# Patient Record
Sex: MALE | Race: WHITE | NOT HISPANIC OR LATINO | Employment: FULL TIME | URBAN - METROPOLITAN AREA
[De-identification: names, ages, dates, MRNs, and addresses within clinical notes are randomized per-mention and may not be internally consistent; named-entity substitution may affect disease eponyms.]

---

## 2023-11-22 ENCOUNTER — HOSPITAL ENCOUNTER (EMERGENCY)
Facility: HOSPITAL | Age: 28
Discharge: HOME | End: 2023-11-22
Attending: EMERGENCY MEDICINE
Payer: COMMERCIAL

## 2023-11-22 VITALS
HEART RATE: 81 BPM | WEIGHT: 215 LBS | SYSTOLIC BLOOD PRESSURE: 133 MMHG | HEIGHT: 69 IN | TEMPERATURE: 97 F | OXYGEN SATURATION: 97 % | RESPIRATION RATE: 16 BRPM | BODY MASS INDEX: 31.84 KG/M2 | DIASTOLIC BLOOD PRESSURE: 100 MMHG

## 2023-11-22 DIAGNOSIS — L03.116 CELLULITIS OF LEFT LOWER EXTREMITY: Primary | ICD-10-CM

## 2023-11-22 PROCEDURE — 2500000004 HC RX 250 GENERAL PHARMACY W/ HCPCS (ALT 636 FOR OP/ED): Performed by: EMERGENCY MEDICINE

## 2023-11-22 PROCEDURE — 99283 EMERGENCY DEPT VISIT LOW MDM: CPT

## 2023-11-22 PROCEDURE — 99285 EMERGENCY DEPT VISIT HI MDM: CPT | Performed by: EMERGENCY MEDICINE

## 2023-11-22 RX ORDER — SULFAMETHOXAZOLE AND TRIMETHOPRIM 800; 160 MG/1; MG/1
1 TABLET ORAL EVERY 12 HOURS
Qty: 20 TABLET | Refills: 0 | Status: SHIPPED | OUTPATIENT
Start: 2023-11-22 | End: 2023-12-02

## 2023-11-22 RX ORDER — SULFAMETHOXAZOLE AND TRIMETHOPRIM 800; 160 MG/1; MG/1
1 TABLET ORAL ONCE
Status: COMPLETED | OUTPATIENT
Start: 2023-11-22 | End: 2023-11-22

## 2023-11-22 RX ADMIN — SULFAMETHOXAZOLE AND TRIMETHOPRIM 1 TABLET: 800; 160 TABLET ORAL at 09:26

## 2023-11-22 ASSESSMENT — COLUMBIA-SUICIDE SEVERITY RATING SCALE - C-SSRS
1. IN THE PAST MONTH, HAVE YOU WISHED YOU WERE DEAD OR WISHED YOU COULD GO TO SLEEP AND NOT WAKE UP?: NO
2. HAVE YOU ACTUALLY HAD ANY THOUGHTS OF KILLING YOURSELF?: NO
6. HAVE YOU EVER DONE ANYTHING, STARTED TO DO ANYTHING, OR PREPARED TO DO ANYTHING TO END YOUR LIFE?: NO

## 2023-11-22 ASSESSMENT — LIFESTYLE VARIABLES
EVER HAD A DRINK FIRST THING IN THE MORNING TO STEADY YOUR NERVES TO GET RID OF A HANGOVER: NO
HAVE YOU EVER FELT YOU SHOULD CUT DOWN ON YOUR DRINKING: NO
HAVE PEOPLE ANNOYED YOU BY CRITICIZING YOUR DRINKING: NO
EVER FELT BAD OR GUILTY ABOUT YOUR DRINKING: NO
REASON UNABLE TO ASSESS: NO

## 2023-11-22 ASSESSMENT — PAIN SCALES - GENERAL: PAINLEVEL_OUTOF10: 8

## 2023-11-22 ASSESSMENT — PAIN - FUNCTIONAL ASSESSMENT: PAIN_FUNCTIONAL_ASSESSMENT: 0-10

## 2023-11-22 NOTE — ED PROVIDER NOTES
HPI   Chief Complaint   Patient presents with    Wound Check     Had multiple scabbed wounds on his LLE. States they have been there aprox 1.5 weeks and now they are becoming painful. Denies any injury to the area.        28 year old male chief complaint of wound on his left leg, no fevers or chills. It started off as a scab and progressed to some redness around it. No swelling or drainage. He has a history of MRSA wounds, and had to have cysts excised. No other symptoms or complaints.                               No data recorded                Patient History   History reviewed. No pertinent past medical history.  Past Surgical History:   Procedure Laterality Date    OTHER SURGICAL HISTORY  02/16/2021    Knee surgery     No family history on file.  Social History     Tobacco Use    Smoking status: Never    Smokeless tobacco: Never   Vaping Use    Vaping Use: Every day   Substance Use Topics    Alcohol use: Not on file    Drug use: Not on file       Physical Exam   ED Triage Vitals [11/22/23 0826]   Temp Heart Rate Resp BP   36.1 °C (97 °F) 81 16 (!) 133/100      SpO2 Temp src Heart Rate Source Patient Position   97 % -- -- --      BP Location FiO2 (%)     -- --       Physical Exam  Vitals and nursing note reviewed.   Constitutional:       Appearance: Normal appearance.   HENT:      Head: Normocephalic and atraumatic.      Nose: Nose normal.      Mouth/Throat:      Mouth: Mucous membranes are moist.   Eyes:      Extraocular Movements: Extraocular movements intact.      Pupils: Pupils are equal, round, and reactive to light.   Cardiovascular:      Rate and Rhythm: Normal rate and regular rhythm.   Pulmonary:      Effort: Pulmonary effort is normal.      Breath sounds: Normal breath sounds.   Abdominal:      General: Abdomen is flat.      Palpations: Abdomen is soft.   Musculoskeletal:         General: Normal range of motion.      Cervical back: Normal range of motion.   Skin:     General: Skin is warm and dry.       Capillary Refill: Capillary refill takes less than 2 seconds.      Findings: Erythema present.      Comments: Scabs on left lower leg with surround erythema no drainage   Neurological:      General: No focal deficit present.      Mental Status: He is alert.   Psychiatric:         Mood and Affect: Mood normal.         ED Course & MDM   Diagnoses as of 11/22/23 0920   Cellulitis of left lower extremity       Medical Decision Making  Medical Decision Making: Patient will be placed on bactrim for 10 days with close follow up. Told to use dial soap to cleanse the wounds. He agrees with the plan of care.   [unfilled]     EKG interpreted by myself (ED attending physician): N/A    Differential Diagnoses Considered: cellulitis, abscess,     Chronic Medical Conditions Significantly Affecting Care: hx of mrsa    External Records Reviewed: I reviewed recent and relevant outside records including: previous imaging    Social Determinants of Health Significantly Affecting Care: lives with family     Diagnostic testing considered: xray of leg, not indicated    Crissy Bucio D.O.  Emergency Medicine          Procedure  Procedures     Crissy Bucio,   11/22/23 0920

## 2023-12-02 ENCOUNTER — HOSPITAL ENCOUNTER (EMERGENCY)
Facility: HOSPITAL | Age: 28
Discharge: HOME | End: 2023-12-02
Attending: STUDENT IN AN ORGANIZED HEALTH CARE EDUCATION/TRAINING PROGRAM
Payer: COMMERCIAL

## 2023-12-02 ENCOUNTER — APPOINTMENT (OUTPATIENT)
Dept: RADIOLOGY | Facility: HOSPITAL | Age: 28
End: 2023-12-02
Payer: COMMERCIAL

## 2023-12-02 VITALS
WEIGHT: 215 LBS | HEART RATE: 74 BPM | HEIGHT: 69 IN | BODY MASS INDEX: 31.84 KG/M2 | RESPIRATION RATE: 16 BRPM | DIASTOLIC BLOOD PRESSURE: 72 MMHG | SYSTOLIC BLOOD PRESSURE: 130 MMHG | OXYGEN SATURATION: 97 %

## 2023-12-02 DIAGNOSIS — M51.36 BULGING LUMBAR DISC: Primary | ICD-10-CM

## 2023-12-02 PROCEDURE — 2500000005 HC RX 250 GENERAL PHARMACY W/O HCPCS: Performed by: STUDENT IN AN ORGANIZED HEALTH CARE EDUCATION/TRAINING PROGRAM

## 2023-12-02 PROCEDURE — 99284 EMERGENCY DEPT VISIT MOD MDM: CPT | Mod: 25 | Performed by: STUDENT IN AN ORGANIZED HEALTH CARE EDUCATION/TRAINING PROGRAM

## 2023-12-02 PROCEDURE — 72131 CT LUMBAR SPINE W/O DYE: CPT

## 2023-12-02 PROCEDURE — 72131 CT LUMBAR SPINE W/O DYE: CPT | Performed by: STUDENT IN AN ORGANIZED HEALTH CARE EDUCATION/TRAINING PROGRAM

## 2023-12-02 PROCEDURE — 2500000004 HC RX 250 GENERAL PHARMACY W/ HCPCS (ALT 636 FOR OP/ED): Performed by: STUDENT IN AN ORGANIZED HEALTH CARE EDUCATION/TRAINING PROGRAM

## 2023-12-02 PROCEDURE — 96372 THER/PROPH/DIAG INJ SC/IM: CPT

## 2023-12-02 RX ORDER — PREDNISONE 50 MG/1
50 TABLET ORAL DAILY
Qty: 5 TABLET | Refills: 0 | Status: SHIPPED | OUTPATIENT
Start: 2023-12-02 | End: 2023-12-07

## 2023-12-02 RX ORDER — PREDNISONE 50 MG/1
50 TABLET ORAL ONCE
Status: COMPLETED | OUTPATIENT
Start: 2023-12-02 | End: 2023-12-02

## 2023-12-02 RX ORDER — LIDOCAINE 560 MG/1
1 PATCH PERCUTANEOUS; TOPICAL; TRANSDERMAL ONCE
Status: DISCONTINUED | OUTPATIENT
Start: 2023-12-02 | End: 2023-12-02 | Stop reason: HOSPADM

## 2023-12-02 RX ORDER — KETOROLAC TROMETHAMINE 10 MG/1
10 TABLET, FILM COATED ORAL EVERY 6 HOURS PRN
Qty: 20 TABLET | Refills: 0 | Status: SHIPPED | OUTPATIENT
Start: 2023-12-02 | End: 2023-12-07 | Stop reason: ALTCHOICE

## 2023-12-02 RX ORDER — KETOROLAC TROMETHAMINE 30 MG/ML
30 INJECTION, SOLUTION INTRAMUSCULAR; INTRAVENOUS ONCE
Status: COMPLETED | OUTPATIENT
Start: 2023-12-02 | End: 2023-12-02

## 2023-12-02 RX ORDER — ORPHENADRINE CITRATE 30 MG/ML
60 INJECTION INTRAMUSCULAR; INTRAVENOUS ONCE
Status: COMPLETED | OUTPATIENT
Start: 2023-12-02 | End: 2023-12-02

## 2023-12-02 RX ORDER — CYCLOBENZAPRINE HCL 10 MG
10 TABLET ORAL NIGHTLY
Qty: 7 TABLET | Refills: 0 | Status: SHIPPED | OUTPATIENT
Start: 2023-12-02 | End: 2023-12-09

## 2023-12-02 RX ADMIN — KETOROLAC TROMETHAMINE 30 MG: 30 INJECTION, SOLUTION INTRAMUSCULAR at 03:38

## 2023-12-02 RX ADMIN — PREDNISONE 50 MG: 50 TABLET ORAL at 03:38

## 2023-12-02 RX ADMIN — ORPHENADRINE CITRATE 60 MG: 60 INJECTION INTRAMUSCULAR; INTRAVENOUS at 03:39

## 2023-12-02 RX ADMIN — LIDOCAINE 1 PATCH: 4 PATCH TOPICAL at 03:39

## 2023-12-02 ASSESSMENT — LIFESTYLE VARIABLES
REASON UNABLE TO ASSESS: NO
EVER FELT BAD OR GUILTY ABOUT YOUR DRINKING: NO
EVER HAD A DRINK FIRST THING IN THE MORNING TO STEADY YOUR NERVES TO GET RID OF A HANGOVER: NO
HAVE YOU EVER FELT YOU SHOULD CUT DOWN ON YOUR DRINKING: NO
HAVE PEOPLE ANNOYED YOU BY CRITICIZING YOUR DRINKING: NO

## 2023-12-02 ASSESSMENT — PAIN DESCRIPTION - LOCATION: LOCATION: BACK

## 2023-12-02 ASSESSMENT — PAIN DESCRIPTION - ORIENTATION: ORIENTATION: LOWER

## 2023-12-02 ASSESSMENT — PAIN SCALES - GENERAL
PAINLEVEL_OUTOF10: 5 - MODERATE PAIN
PAINLEVEL_OUTOF10: 5 - MODERATE PAIN

## 2023-12-02 ASSESSMENT — COLUMBIA-SUICIDE SEVERITY RATING SCALE - C-SSRS
2. HAVE YOU ACTUALLY HAD ANY THOUGHTS OF KILLING YOURSELF?: NO
1. IN THE PAST MONTH, HAVE YOU WISHED YOU WERE DEAD OR WISHED YOU COULD GO TO SLEEP AND NOT WAKE UP?: NO
6. HAVE YOU EVER DONE ANYTHING, STARTED TO DO ANYTHING, OR PREPARED TO DO ANYTHING TO END YOUR LIFE?: NO

## 2023-12-02 ASSESSMENT — PAIN - FUNCTIONAL ASSESSMENT: PAIN_FUNCTIONAL_ASSESSMENT: 0-10

## 2023-12-02 ASSESSMENT — PAIN DESCRIPTION - PAIN TYPE: TYPE: ACUTE PAIN

## 2023-12-02 NOTE — ED PROVIDER NOTES
History/Exam limitations: none  HPI was provided by patient    HPI:    Chief Complaint   Patient presents with    Back Pain     PT reports he bent over to  a laundry basket x3 days ago and felt a pop in his lower back. Pt reports the pain goes down to his left knee. He reports ibuprofen and icy hot help his pain but ran out of the icy hot patches. Pt denied any falls. He is ambulatory and A&Ox4.         Markie Mukhejree is a 28 y.o. male presents with chief complaint of back pain.  Patient 3 days ago states he was lifting a basket when he heard a pop in his back and now has pain in his right lumbar region going down the back of his leg.  She tried over-the-counter medication without any relief.  Pain is worse when he lies flat or flexes his torso.  He has no history of back pain.  States pain been worsening over the past few days.     ROS:   CONSTITUTIONAL fever, chills.  ENT sore throat, congestion, rhinorrhea.  CARDIOVASCULAR chest pain, palpitations.  RESPIRATORY cough, shortness of breath, wheeze.  GI abdominal pain, nausea, vomiting, diarrhea.  GENITOURINARY dysuria, hematuria, frequency.  Urinary incontinence  MUSCULOSKELETAL deformity, neck pain, gait abnormality. back pain  SKIN denies rash, color change.  NEUROLOGIC:  headache, numbness, focal weakness, perianal anesthesia      Physical Exam:  General/Constitutional: Alert, oriented , cooperative,  in no acute distress.  Head: normocephalic, atraumatic  Skin: Intact,  dry skin, no lesions.  Eyes: PERRL, EOMs intact,  Conjunctiva pink with no erythema or exudates. No scleral icterus.   ENT: No external deformities. Nares patent, mucus membranes moist.  Pharynx clear, uvula midline.   Neck: Supple, without meningismus. Trachea at midline. No lymphadenopathy.  Pulmonary: Clear bilaterally. No crackles, rhonchi or wheezing.   Cardiac: Regular rate and rhythm. good pulses.  Abdomen: Soft, nontender, active bowel sounds.  No palpable organomegaly.  No rebound  or guarding.  No CVA tenderness.  Genitourinary: Exam deferred.  Musculoskeletal: Full range of motion, no deformity.   Pulses full and equal.   Non-edematous.   no injury, no color change to skin, full range of motion with plantar and dorsiflexion of bilateral lower extremities  C T and L-spine without tender to palpation paraspinal or step-offs or deformities to spinous processes  Neurological:  CN II through XII are grossly intact, no focal neuro deficits.  Strength 5 out of 5 throughout bilateral upper and lower extremities neurovascular intact in bilateral upper and lower extremities.   Psychiatric: Appropriate mood and affect. Calm.       No past medical history on file.   Social History     Socioeconomic History    Marital status: Single     Spouse name: Not on file    Number of children: Not on file    Years of education: Not on file    Highest education level: Not on file   Occupational History    Not on file   Tobacco Use    Smoking status: Never    Smokeless tobacco: Never   Vaping Use    Vaping Use: Every day   Substance and Sexual Activity    Alcohol use: Not on file    Drug use: Not on file    Sexual activity: Not on file   Other Topics Concern    Not on file   Social History Narrative    Not on file     Social Determinants of Health     Financial Resource Strain: Not on file   Food Insecurity: Not on file   Transportation Needs: Not on file   Physical Activity: Not on file   Stress: Not on file   Social Connections: Not on file   Intimate Partner Violence: Not on file   Housing Stability: Not on file     Current Outpatient Medications   Medication Instructions    cyclobenzaprine (FLEXERIL) 10 mg, oral, Nightly    meloxicam (MOBIC) 15 mg, oral, Daily    methocarbamol (Robaxin) 500 mg tablet Take 1-2 tabs every 8 hours as needed for spasms     No Known Allergies    ED Triage Vitals [12/02/23 0050]   Temp Heart Rate Resp BP   -- 70 16 121/77      SpO2 Temp src Heart Rate Source Patient Position   96 % --  -- --      BP Location FiO2 (%)     -- --               Labs and Imaging  CT lumbar spine wo IV contrast   Final Result   No acute fracture or traumatic malalignment of the lumbar spine.        Mild multilevel lumbar spondylosis as described above.        MACRO:   None.        Signed by: Caesar Vick 12/2/2023 3:19 AM   Dictation workstation:   [id number]        Labs Reviewed - No data to display      Medical Decision Making:     The patient presented for evaluation for back pain.  Differential includes but not limited to UTI, pyelonephritis, musculoskeletal pain, exacerbation of chronic back pain.  I do not think this is cauda equina have low suspicion for emergent MRI is not needed.    Imaging studies if performed were independently reviewed and interpreted by myself and confirmed by radiologist.        External Records Reviewed: I reviewed recent and relevant outside records    ED Course as of 12/08/23 0038   Sat Dec 02, 2023   0328 CT imaging shows some bulging disc in the lower lumbar region.  Otherwise no fractures.  He was given lidocaine patch, Toradol Norflex and prednisone here and we will give him prescription to go home with as this improved his pain here for Toradol Flexeril and prednisone.  We will give him spine follow-up with. [WL]      ED Course User Index  [WL] Reji Neal DO         Diagnoses as of 12/08/23 0038   Bulging lumbar disc         CT lumbar spine wo IV contrast   Final Result   No acute fracture or traumatic malalignment of the lumbar spine.        Mild multilevel lumbar spondylosis as described above.        MACRO:   None.        Signed by: Caesar Vick 12/2/2023 3:19 AM   Dictation workstation:   [id number]        Labs Reviewed - No data to display        Procedure  Procedures       The patient  has stable vs and will be discharge home.   The patient and caregiver are in agreement with the plan and given instructions to follow up       I discussed the differential,  results and plan with the patient and/or family/friend/caregiver if present.       I emphasized the importance of follow-up with the physician I referred them to in the timeframe recommended.  I explained reasons for the patient to return to the Emergency Department. Additional verbal discharge instructions were also given and discussed with the patient to supplement those generated by the EMR. We also discussed medications that were prescribed (if any) including common side effects and interactions. The patient was advised to abstain from driving, operating heavy machinery or making significant decisions while taking medications such as opiates and muscle relaxers that may impair this. All questions were addressed.  They understand return precautions and discharge instructions. The patient and/or family/friend/caregiver expressed understanding.     Note: This note was dictated by speech recognition. Minor errors in transcription may be present.           Reji Neal DO  12/08/23 0038

## 2023-12-07 ENCOUNTER — OFFICE VISIT (OUTPATIENT)
Dept: ORTHOPEDIC SURGERY | Facility: CLINIC | Age: 28
End: 2023-12-07
Payer: COMMERCIAL

## 2023-12-07 DIAGNOSIS — M54.16 ACUTE LEFT LUMBAR RADICULOPATHY: Primary | ICD-10-CM

## 2023-12-07 PROCEDURE — 1036F TOBACCO NON-USER: CPT | Performed by: PHYSICIAN ASSISTANT

## 2023-12-07 PROCEDURE — 99204 OFFICE O/P NEW MOD 45 MIN: CPT | Performed by: PHYSICIAN ASSISTANT

## 2023-12-07 RX ORDER — MELOXICAM 15 MG/1
15 TABLET ORAL DAILY
Qty: 30 TABLET | Refills: 3 | Status: SHIPPED | OUTPATIENT
Start: 2023-12-07

## 2023-12-07 RX ORDER — METHOCARBAMOL 500 MG/1
TABLET, FILM COATED ORAL
Qty: 60 TABLET | Refills: 2 | Status: SHIPPED | OUTPATIENT
Start: 2023-12-07

## 2023-12-07 ASSESSMENT — PAIN SCALES - GENERAL: PAINLEVEL_OUTOF10: 2

## 2023-12-07 ASSESSMENT — PAIN - FUNCTIONAL ASSESSMENT: PAIN_FUNCTIONAL_ASSESSMENT: 0-10

## 2023-12-12 NOTE — PROGRESS NOTES
"Markie is a 28-year-old male reporting clinic today for evaluation of his acute low back pain.    His symptoms started approximately 1 week ago he was carrying a laundry basket when his back \"popped\".  He was seen in the emergency room on 12/2.  He has midline low back pain that radiates to the left with pain radiating down his anterior left leg.  His leg pain does not radiate past his knee.  His symptoms are increased with walking and decreased with sitting.  He has no right leg symptoms.  He denies weakness, dry or tripping over his feet.  He has full control of his bowel and bladder.    Treatment thus far has consisted of medications prescribed by the ED, cyclobenzaprine, Toradol, and prednisone.  These medications have significantly improved his symptoms.  His prescriptions are coming to an end in the next few days.    Family, social, and medical histories are obtained and reviewed.  He is currently on antibiotics for a left leg cellulitis.    ROS: All other systems have been reviewed and are negative except as previously noted in history of present illness.    Const: Well-appearing, well-nourished male in no distress.  Eyes: Normal appearing sclera and conjunctiva, no jaundice, pupils normal in appearance.  Resp: breathing comfortably, normal respiratory rate.  CV: No upper or lower extremity edema.  Musculoskeletal: Normal gait.  Lumbar ROM is supple.  Strength exam of the lower extremities reveals 5/5 strength in all major muscle groups.  Negative straight leg raise bilaterally.  Neuro: Sensation is intact and equal bilaterally. Deep tendon reflexes are normal and symmetric.  No clonus.  Skin: Intact without any lesions, normal turgor.  There are open sores on the left lower extremity.  Psych: Alert and oriented x3, normal mood and affect.    I personally reviewed a CT scan of the lumbar spine completed on 12/2.  There is no evidence of acute fracture or instability.  Mild degenerative changes of the lumbar " spine.    The plan is to start conservative treatment for his acute low back pain with left leg radiculopathy.  A referral for physical therapy was provided today.  Prescriptions for meloxicam 15 mg and methocarbamol 500 mg were sent to his pharmacy for inflammation and muscle spasms.  He should discontinue use of all other NSAIDs while taking meloxicam.  If he does not have improvement after 6 weeks of conservative treatment he should follow-up with me at which time I would consider an MRI of the lumbar spine.    **This note was dictated using speech recognition software and was not corrected for spelling or grammatical errors**

## 2023-12-28 ENCOUNTER — HOSPITAL ENCOUNTER (EMERGENCY)
Facility: HOSPITAL | Age: 28
Discharge: HOME | End: 2023-12-28
Attending: STUDENT IN AN ORGANIZED HEALTH CARE EDUCATION/TRAINING PROGRAM
Payer: COMMERCIAL

## 2023-12-28 VITALS
OXYGEN SATURATION: 98 % | WEIGHT: 216.05 LBS | SYSTOLIC BLOOD PRESSURE: 149 MMHG | TEMPERATURE: 96.8 F | HEIGHT: 69 IN | DIASTOLIC BLOOD PRESSURE: 97 MMHG | RESPIRATION RATE: 16 BRPM | HEART RATE: 80 BPM | BODY MASS INDEX: 32 KG/M2

## 2023-12-28 DIAGNOSIS — K59.00 CONSTIPATION, UNSPECIFIED CONSTIPATION TYPE: Primary | ICD-10-CM

## 2023-12-28 PROCEDURE — 2500000001 HC RX 250 WO HCPCS SELF ADMINISTERED DRUGS (ALT 637 FOR MEDICARE OP): Performed by: STUDENT IN AN ORGANIZED HEALTH CARE EDUCATION/TRAINING PROGRAM

## 2023-12-28 PROCEDURE — 99282 EMERGENCY DEPT VISIT SF MDM: CPT

## 2023-12-28 PROCEDURE — 99283 EMERGENCY DEPT VISIT LOW MDM: CPT | Performed by: STUDENT IN AN ORGANIZED HEALTH CARE EDUCATION/TRAINING PROGRAM

## 2023-12-28 PROCEDURE — 2500000004 HC RX 250 GENERAL PHARMACY W/ HCPCS (ALT 636 FOR OP/ED): Performed by: STUDENT IN AN ORGANIZED HEALTH CARE EDUCATION/TRAINING PROGRAM

## 2023-12-28 RX ORDER — AMOXICILLIN 250 MG
1 CAPSULE ORAL NIGHTLY
Status: DISCONTINUED | OUTPATIENT
Start: 2023-12-28 | End: 2023-12-28

## 2023-12-28 RX ORDER — AMOXICILLIN 250 MG
1 CAPSULE ORAL DAILY
Qty: 30 TABLET | Refills: 0 | Status: SHIPPED | OUTPATIENT
Start: 2023-12-28 | End: 2024-12-27

## 2023-12-28 RX ORDER — AMOXICILLIN 250 MG
1 CAPSULE ORAL ONCE
Status: COMPLETED | OUTPATIENT
Start: 2023-12-28 | End: 2023-12-28

## 2023-12-28 RX ORDER — GLYCERIN 1 G/1
1 SUPPOSITORY RECTAL ONCE
Status: COMPLETED | OUTPATIENT
Start: 2023-12-28 | End: 2023-12-28

## 2023-12-28 RX ORDER — POLYETHYLENE GLYCOL 3350 17 G/17G
17 POWDER, FOR SOLUTION ORAL DAILY
Status: DISCONTINUED | OUTPATIENT
Start: 2023-12-28 | End: 2023-12-28

## 2023-12-28 RX ORDER — POLYETHYLENE GLYCOL 3350 17 G/17G
17 POWDER, FOR SOLUTION ORAL 2 TIMES DAILY
Qty: 510 G | Refills: 0 | Status: SHIPPED | OUTPATIENT
Start: 2023-12-28

## 2023-12-28 RX ORDER — POLYETHYLENE GLYCOL 3350 17 G/17G
17 POWDER, FOR SOLUTION ORAL ONCE
Status: COMPLETED | OUTPATIENT
Start: 2023-12-28 | End: 2023-12-28

## 2023-12-28 RX ADMIN — POLYETHYLENE GLYCOL 3350 17 G: 17 POWDER, FOR SOLUTION ORAL at 06:32

## 2023-12-28 RX ADMIN — GLYCERIN 1 SUPPOSITORY: 2 SUPPOSITORY RECTAL at 06:32

## 2023-12-28 RX ADMIN — SENNOSIDES AND DOCUSATE SODIUM 1 TABLET: 8.6; 5 TABLET ORAL at 06:33

## 2023-12-28 ASSESSMENT — PAIN - FUNCTIONAL ASSESSMENT: PAIN_FUNCTIONAL_ASSESSMENT: 0-10

## 2023-12-28 ASSESSMENT — PAIN SCALES - GENERAL: PAINLEVEL_OUTOF10: 5 - MODERATE PAIN

## 2023-12-28 ASSESSMENT — LIFESTYLE VARIABLES
HAVE PEOPLE ANNOYED YOU BY CRITICIZING YOUR DRINKING: NO
EVER FELT BAD OR GUILTY ABOUT YOUR DRINKING: NO
HAVE YOU EVER FELT YOU SHOULD CUT DOWN ON YOUR DRINKING: NO
REASON UNABLE TO ASSESS: NO
EVER HAD A DRINK FIRST THING IN THE MORNING TO STEADY YOUR NERVES TO GET RID OF A HANGOVER: NO

## 2023-12-28 ASSESSMENT — COLUMBIA-SUICIDE SEVERITY RATING SCALE - C-SSRS
6. HAVE YOU EVER DONE ANYTHING, STARTED TO DO ANYTHING, OR PREPARED TO DO ANYTHING TO END YOUR LIFE?: NO
2. HAVE YOU ACTUALLY HAD ANY THOUGHTS OF KILLING YOURSELF?: NO
1. IN THE PAST MONTH, HAVE YOU WISHED YOU WERE DEAD OR WISHED YOU COULD GO TO SLEEP AND NOT WAKE UP?: NO

## 2023-12-28 ASSESSMENT — PAIN DESCRIPTION - LOCATION: LOCATION: ABDOMEN

## 2023-12-28 NOTE — ED PROVIDER NOTES
"CC: Constipation (Pt has been constipated for over a week.)     HPI:  28-year-old male presents to the emergency department constipation.  States he has been constipated for about a week.  States he has been having bowel movements however he still feels like he has to go after he finishes.  Patient states this is abnormal for him because he usually goes after every time he eats.  Patient denies nausea or vomiting.  Endorses some mild abdominal discomfort.  States he is hungry but has not felt like eating much.  States he recently hurt his back and was prescribed multiple medications.  On chart review it appears he was started on Flexeril.  Patient states he stopped taking the medication because it \"messed him up\".  States his back is feeling significantly better now.  Denies history of intra-abdominal surgeries.  Denies difficulty urinating.  Has tried stool softeners at home without significant relief.    Records Reviewed:  Recent available ED and inpatient notes reviewed in EMR.    PMHx/PSHx:  Per HPI.   - has no past medical history on file.  - has a past surgical history that includes Other surgical history (02/16/2021).  - has Cellulitis of left lower extremity on their problem list.    Medications:  Reviewed in EMR. See EMR for complete list of medications and doses.    Allergies:  Patient has no known allergies.    Social History:  - Tobacco:  reports that he has never smoked. He has never used smokeless tobacco.   - Alcohol:  has no history on file for alcohol use.   - Illicit Drugs:  has no history on file for drug use.     ROS:  Per HPI.       ???????????????????????????????????????????????????????????????  Triage Vitals:  T 36 °C (96.8 °F)  HR 69  /82  RR 18  O2 97 % None (Room air)    Physical Exam  ???????????????????????????????????????????????????????????????  GEN: well appearing, no acute distress  HEAD: atraumatic  EYES: PERRL, EOMI, no scleral icterus  ENT: mmm  NECK: no JVD  CVS/CHEST: " reg rate, nl rhythm  PULM: CTA b/l no wheezes, crackles, or rhonchi   GI: soft, NT/ND, no rebound or guarding   BACK: o vertebral point tenderness  : No palpable stool ball.  NEURO: Awake and alert, normal ambulation  SKIN: warm, dry  PSYCH: AAOx3 answers questions appropriately    Assessment and Plan:  28-year-old male presents emergency department constipation.  Abdomen soft and nontender.  Low suspicion for bowel obstruction.  Low suspicion for cauda equina.  No back pain.  Neurologically intact.  Treated with a bowel regimen will be prescribed a bowel regimen for home-going.  Patient amendable to rectal exam.  Will be performed and a suppository will be placed.  Discussed anticipated clinical course and strict return precautions.     ED Course:  Diagnoses as of 12/28/23 0634   Constipation, unspecified constipation type       Social Determinants Limiting Care:  None identified    Disposition:  Discharged in stable condition with return precautions    Awa Sultana DO      Procedures ? [employer] last updated 12/28/2023 6:04 AM        Awa Sultana,   12/28/23 0634

## 2024-02-01 ENCOUNTER — APPOINTMENT (OUTPATIENT)
Dept: ORTHOPEDIC SURGERY | Facility: CLINIC | Age: 29
End: 2024-02-01
Payer: COMMERCIAL

## 2024-08-13 ENCOUNTER — HOSPITAL ENCOUNTER (OUTPATIENT)
Facility: HOSPITAL | Age: 29
Setting detail: OBSERVATION
Discharge: HOME | End: 2024-08-14
Attending: STUDENT IN AN ORGANIZED HEALTH CARE EDUCATION/TRAINING PROGRAM | Admitting: INTERNAL MEDICINE
Payer: COMMERCIAL

## 2024-08-13 DIAGNOSIS — T18.198S: Primary | ICD-10-CM

## 2024-08-13 DIAGNOSIS — K29.70 GASTRITIS WITHOUT BLEEDING, UNSPECIFIED CHRONICITY, UNSPECIFIED GASTRITIS TYPE: ICD-10-CM

## 2024-08-13 DIAGNOSIS — T17.308A CHOKING, INITIAL ENCOUNTER: ICD-10-CM

## 2024-08-13 DIAGNOSIS — Z72.0 TOBACCO ABUSE: ICD-10-CM

## 2024-08-13 LAB
ALBUMIN SERPL BCP-MCNC: 4.2 G/DL (ref 3.4–5)
ALP SERPL-CCNC: 56 U/L (ref 33–120)
ALT SERPL W P-5'-P-CCNC: 45 U/L (ref 10–52)
ANION GAP SERPL CALC-SCNC: 14 MMOL/L (ref 10–20)
APTT PPP: 34 SECONDS (ref 27–38)
AST SERPL W P-5'-P-CCNC: 25 U/L (ref 9–39)
BASOPHILS # BLD AUTO: 0.05 X10*3/UL (ref 0–0.1)
BASOPHILS NFR BLD AUTO: 0.6 %
BILIRUB SERPL-MCNC: 1.4 MG/DL (ref 0–1.2)
BUN SERPL-MCNC: 11 MG/DL (ref 6–23)
CALCIUM SERPL-MCNC: 8.7 MG/DL (ref 8.6–10.3)
CHLORIDE SERPL-SCNC: 103 MMOL/L (ref 98–107)
CO2 SERPL-SCNC: 26 MMOL/L (ref 21–32)
CREAT SERPL-MCNC: 0.98 MG/DL (ref 0.5–1.3)
EGFRCR SERPLBLD CKD-EPI 2021: >90 ML/MIN/1.73M*2
EOSINOPHIL # BLD AUTO: 0.07 X10*3/UL (ref 0–0.7)
EOSINOPHIL NFR BLD AUTO: 0.9 %
ERYTHROCYTE [DISTWIDTH] IN BLOOD BY AUTOMATED COUNT: 11.6 % (ref 11.5–14.5)
GLUCOSE SERPL-MCNC: 77 MG/DL (ref 74–99)
HCT VFR BLD AUTO: 46.7 % (ref 41–52)
HGB BLD-MCNC: 16.8 G/DL (ref 13.5–17.5)
IMM GRANULOCYTES # BLD AUTO: 0.02 X10*3/UL (ref 0–0.7)
IMM GRANULOCYTES NFR BLD AUTO: 0.2 % (ref 0–0.9)
INR PPP: 1.1 (ref 0.9–1.1)
LYMPHOCYTES # BLD AUTO: 2.29 X10*3/UL (ref 1.2–4.8)
LYMPHOCYTES NFR BLD AUTO: 27.9 %
MAGNESIUM SERPL-MCNC: 2.11 MG/DL (ref 1.6–2.4)
MCH RBC QN AUTO: 31.9 PG (ref 26–34)
MCHC RBC AUTO-ENTMCNC: 36 G/DL (ref 32–36)
MCV RBC AUTO: 89 FL (ref 80–100)
MONOCYTES # BLD AUTO: 0.49 X10*3/UL (ref 0.1–1)
MONOCYTES NFR BLD AUTO: 6 %
NEUTROPHILS # BLD AUTO: 5.3 X10*3/UL (ref 1.2–7.7)
NEUTROPHILS NFR BLD AUTO: 64.4 %
NRBC BLD-RTO: 0 /100 WBCS (ref 0–0)
PLATELET # BLD AUTO: 251 X10*3/UL (ref 150–450)
POTASSIUM SERPL-SCNC: 3.9 MMOL/L (ref 3.5–5.3)
PROT SERPL-MCNC: 6.7 G/DL (ref 6.4–8.2)
PROTHROMBIN TIME: 12.4 SECONDS (ref 9.8–12.8)
RBC # BLD AUTO: 5.27 X10*6/UL (ref 4.5–5.9)
SODIUM SERPL-SCNC: 139 MMOL/L (ref 136–145)
WBC # BLD AUTO: 8.2 X10*3/UL (ref 4.4–11.3)

## 2024-08-13 PROCEDURE — 85610 PROTHROMBIN TIME: CPT | Performed by: NURSE PRACTITIONER

## 2024-08-13 PROCEDURE — 2500000001 HC RX 250 WO HCPCS SELF ADMINISTERED DRUGS (ALT 637 FOR MEDICARE OP): Performed by: NURSE PRACTITIONER

## 2024-08-13 PROCEDURE — 99285 EMERGENCY DEPT VISIT HI MDM: CPT | Mod: 25

## 2024-08-13 PROCEDURE — 85025 COMPLETE CBC W/AUTO DIFF WBC: CPT | Performed by: NURSE PRACTITIONER

## 2024-08-13 PROCEDURE — 36415 COLL VENOUS BLD VENIPUNCTURE: CPT | Performed by: NURSE PRACTITIONER

## 2024-08-13 PROCEDURE — 85730 THROMBOPLASTIN TIME PARTIAL: CPT | Performed by: NURSE PRACTITIONER

## 2024-08-13 PROCEDURE — 99222 1ST HOSP IP/OBS MODERATE 55: CPT | Performed by: NURSE PRACTITIONER

## 2024-08-13 PROCEDURE — 83735 ASSAY OF MAGNESIUM: CPT | Performed by: NURSE PRACTITIONER

## 2024-08-13 PROCEDURE — 80053 COMPREHEN METABOLIC PANEL: CPT | Performed by: NURSE PRACTITIONER

## 2024-08-13 PROCEDURE — 96374 THER/PROPH/DIAG INJ IV PUSH: CPT

## 2024-08-13 PROCEDURE — 2500000004 HC RX 250 GENERAL PHARMACY W/ HCPCS (ALT 636 FOR OP/ED): Performed by: NURSE PRACTITIONER

## 2024-08-13 RX ORDER — IBUPROFEN 200 MG
1 TABLET ORAL DAILY
Status: DISCONTINUED | OUTPATIENT
Start: 2024-08-14 | End: 2024-08-15 | Stop reason: HOSPADM

## 2024-08-13 RX ORDER — ALUMINUM HYDROXIDE, MAGNESIUM HYDROXIDE, AND SIMETHICONE 1200; 120; 1200 MG/30ML; MG/30ML; MG/30ML
30 SUSPENSION ORAL ONCE
Status: COMPLETED | OUTPATIENT
Start: 2024-08-13 | End: 2024-08-13

## 2024-08-13 RX ORDER — LIDOCAINE HYDROCHLORIDE 20 MG/ML
15 SOLUTION OROPHARYNGEAL ONCE
Status: COMPLETED | OUTPATIENT
Start: 2024-08-13 | End: 2024-08-13

## 2024-08-13 RX ORDER — DEXTROSE MONOHYDRATE AND SODIUM CHLORIDE 5; .9 G/100ML; G/100ML
100 INJECTION, SOLUTION INTRAVENOUS CONTINUOUS
Status: DISCONTINUED | OUTPATIENT
Start: 2024-08-13 | End: 2024-08-14

## 2024-08-13 RX ORDER — PANTOPRAZOLE SODIUM 40 MG/10ML
40 INJECTION, POWDER, LYOPHILIZED, FOR SOLUTION INTRAVENOUS DAILY
Status: DISCONTINUED | OUTPATIENT
Start: 2024-08-14 | End: 2024-08-15 | Stop reason: HOSPADM

## 2024-08-13 ASSESSMENT — ENCOUNTER SYMPTOMS
HEADACHES: 0
EYE ITCHING: 0
RHINORRHEA: 0
DIZZINESS: 0
VOMITING: 0
AGITATION: 0
DIARRHEA: 0
NECK PAIN: 0
COLOR CHANGE: 0
SHORTNESS OF BREATH: 0
EYE REDNESS: 0
FEVER: 0
PALPITATIONS: 0
COUGH: 0
NAUSEA: 0
CONFUSION: 0
ABDOMINAL PAIN: 0
DYSURIA: 0
NECK STIFFNESS: 0
CONSTIPATION: 0
CHILLS: 0

## 2024-08-13 ASSESSMENT — PAIN - FUNCTIONAL ASSESSMENT: PAIN_FUNCTIONAL_ASSESSMENT: 0-10

## 2024-08-13 ASSESSMENT — LIFESTYLE VARIABLES
EVER HAD A DRINK FIRST THING IN THE MORNING TO STEADY YOUR NERVES TO GET RID OF A HANGOVER: NO
HAVE PEOPLE ANNOYED YOU BY CRITICIZING YOUR DRINKING: NO
TOTAL SCORE: 0
EVER FELT BAD OR GUILTY ABOUT YOUR DRINKING: NO
HAVE YOU EVER FELT YOU SHOULD CUT DOWN ON YOUR DRINKING: NO

## 2024-08-13 ASSESSMENT — PAIN SCALES - GENERAL
PAINLEVEL_OUTOF10: 0 - NO PAIN
PAINLEVEL_OUTOF10: 0 - NO PAIN

## 2024-08-13 NOTE — ED PROVIDER NOTES
[facility]  [facility]  ED  Encounter Note  Admit Date/RoomTime: 2024  5:59 PM  ED Room: 128/128-A  NAME: Markie Mukherjee  : 1995  MRN: [id number]     Chief Complaint:  Choking (Pt states at 1am this morning he was eating a chicken sand which at work when he started choking on it. He want home after work. He states no pain except when he tries to drink anything. Isn't actively choking but can feel the obstruction)    HISTORY OF PRESENT ILLNESS        Markie Mukherjee is a 29 y.o. male who presents to the ED for evaluation of possible aspiration of chicken in the airway. He was at work and felt a piece of chicken get stuck. Was a sandwich. Hx of same, gerd does not take meds for gerd.    He stated that he is not able to swallow anything and has been spitting his secretions.    Not able to eat or drink since then. He tries to swallow fluids but then vomits them up. Not sure when last time he had scope or where. Feels could have been here. Has pain in upper chest area.     ROS   Pertinent positives and negatives are stated within HPI, all other systems reviewed and are negative.    Past Medical History:  has no past medical history on file.    Surgical History:  has a past surgical history that includes Other surgical history (2021).    Social History:  reports that he has never smoked. He has never used smokeless tobacco.    Family History: family history is not on file.     Allergies: Patient has no known allergies.    PHYSICAL EXAM   Oxygen Saturation Interpretation: Normal.          Physical Exam  Constitutional/General: Alert and oriented x3, well appearing, non toxic  HEENT:  NC/NT. PERRLA.  Airway patent.  Neck: Supple, full ROM. No midline vertebral tenderness or crepitus.   Respiratory: Lung sounds clear to auscultation bilaterally. No wheezes, rhonchi or stridor. Not in respiratory distress.  CV:  Regular rate. Regular rhythm. No murmurs or rubs. 2+ distal pulses.  GI:  Abdomen  soft, non-tender, non-distended. +BS. No rebound, guarding, or rigidity. No pulsatile masses.  Musculoskeletal: Moves all extremities x 4. Warm and well perfused. Capillary refill <3 seconds  Integument: Skin warm and dry. No rashes.   Neurologic: Alert and oriented with no focal deficits, symmetric strength 5/5 in the upper and lower extremities bilaterally.  Psychiatric: Normal affect.    Lab / Imaging Results   (All laboratory and radiology results have been personally reviewed by myself)  Labs:  Results for orders placed or performed during the hospital encounter of 08/13/24   CBC and Auto Differential   Result Value Ref Range    WBC 8.2 4.4 - 11.3 x10*3/uL    nRBC 0.0 0.0 - 0.0 /100 WBCs    RBC 5.27 4.50 - 5.90 x10*6/uL    Hemoglobin 16.8 13.5 - 17.5 g/dL    Hematocrit 46.7 41.0 - 52.0 %    MCV 89 80 - 100 fL    MCH 31.9 26.0 - 34.0 pg    MCHC 36.0 32.0 - 36.0 g/dL    RDW 11.6 11.5 - 14.5 %    Platelets 251 150 - 450 x10*3/uL    Neutrophils % 64.4 40.0 - 80.0 %    Immature Granulocytes %, Automated 0.2 0.0 - 0.9 %    Lymphocytes % 27.9 13.0 - 44.0 %    Monocytes % 6.0 2.0 - 10.0 %    Eosinophils % 0.9 0.0 - 6.0 %    Basophils % 0.6 0.0 - 2.0 %    Neutrophils Absolute 5.30 1.20 - 7.70 x10*3/uL    Immature Granulocytes Absolute, Automated 0.02 0.00 - 0.70 x10*3/uL    Lymphocytes Absolute 2.29 1.20 - 4.80 x10*3/uL    Monocytes Absolute 0.49 0.10 - 1.00 x10*3/uL    Eosinophils Absolute 0.07 0.00 - 0.70 x10*3/uL    Basophils Absolute 0.05 0.00 - 0.10 x10*3/uL   Magnesium   Result Value Ref Range    Magnesium 2.11 1.60 - 2.40 mg/dL   Comprehensive metabolic panel   Result Value Ref Range    Glucose 77 74 - 99 mg/dL    Sodium 139 136 - 145 mmol/L    Potassium 3.9 3.5 - 5.3 mmol/L    Chloride 103 98 - 107 mmol/L    Bicarbonate 26 21 - 32 mmol/L    Anion Gap 14 10 - 20 mmol/L    Urea Nitrogen 11 6 - 23 mg/dL    Creatinine 0.98 0.50 - 1.30 mg/dL    eGFR >90 >60 mL/min/1.73m*2    Calcium 8.7 8.6 - 10.3 mg/dL    Albumin  4.2 3.4 - 5.0 g/dL    Alkaline Phosphatase 56 33 - 120 U/L    Total Protein 6.7 6.4 - 8.2 g/dL    AST 25 9 - 39 U/L    Bilirubin, Total 1.4 (H) 0.0 - 1.2 mg/dL    ALT 45 10 - 52 U/L   aPTT   Result Value Ref Range    aPTT 34 27 - 38 seconds   Protime-INR   Result Value Ref Range    Protime 12.4 9.8 - 12.8 seconds    INR 1.1 0.9 - 1.1   Type and screen   Result Value Ref Range    ABO TYPE O     Rh TYPE POS     ANTIBODY SCREEN NEG    ECG 12 lead   Result Value Ref Range    Ventricular Rate 53 BPM    Atrial Rate 53 BPM    SD Interval 128 ms    QRS Duration 102 ms    QT Interval 442 ms    QTC Calculation(Bazett) 414 ms    P Axis 22 degrees    R Axis 58 degrees    T Axis 7 degrees    QRS Count 8 beats    Q Onset 218 ms    P Onset 154 ms    P Offset 204 ms    T Offset 439 ms    QTC Fredericia 424 ms     Imaging:  All Radiology results interpreted by Radiologist unless otherwise noted.  No orders to display       ED Course / Medical Decision Making     Medications   sodium chloride 0.9 % bolus 1,000 mL (0 mL intravenous Stopped 8/13/24 1858)   glucagon (Glucagen) injection 1 mg (1 mg intravenous Given 8/13/24 1829)   alum-mag hydroxide-simeth (Mylanta) 200-200-20 mg/5 mL oral suspension 30 mL (30 mL oral Given 8/13/24 1829)   lidocaine (Xylocaine) 2 % mouth solution 15 mL (15 mL Mouth/Throat Given 8/13/24 1829)     ED Course as of 08/16/24 2028   Tue Aug 13, 2024   1910 GENERAL SURGERY HERE UNABLE TO DO UPPER SCOPE; WILL NEED TO TRANSFER. PT WILL NEED TO TRANSFER WHEN GI IS [PK]   2045 Transfer center recommends keeps pt in ED and see if GI can assist in ED. No coverage elsewhere but even if so, no beds at this time [PK]   2100 Attending spoke with GI Dr AUSTIN who agreed to see pt in am and do scope [PK]      ED Course User Index  [PK] Sabrina Schumacher, APRN-CNP         Diagnoses as of 08/16/24 2028   Choking, initial encounter   Gastritis without bleeding, unspecified chronicity, unspecified gastritis type      Re-examination:    Patient’s condition stable but no improvement  Glucagon did not help    Consult(s):   IP CONSULT TO GASTROENTEROLOGY        MDM:       Markie Mukherjee is a 29 y.o. male who presents to the ED for evaluation of possible aspiration of chicken in the airway. He was at work and felt a piece of chicken get stuck. Was a sandwich. Hx of same, gerd does not take meds for gerd.    He stated that he is not able to swallow anything and has been spitting his secretions.    Not able to eat or drink since then. He tries to swallow fluids but then vomits them up. Not sure when last time he had scope or where. Feels could have been here. Has pain in upper chest area.     ED course  Attempted glucagon small sips of soda.  Receive IV PPI. Nothing made better.  Discussed with transfer center who recommend that we reach out to GI to see if he would be willing to accept in am to do scope. No beds anywhere and if transferred would not be before mid to last morning.    Vss labs stable  GI would be willing to see in am and scope.  Admitted to hospitalist  Ddx: foreign body gerd    Plan of Care/Counseling:  I reviewed today's visit with the patient  in addition to providing specific details for the plan of care and counseling regarding the diagnosis and prognosis.  Questions are answered at this time and are agreeable with the plan.    ASSESSMENT     1. Other foreign object in esophagus causing other injury, sequela    2. Choking, initial encounter    3. Tobacco abuse    4. Gastritis without bleeding, unspecified chronicity, unspecified gastritis type      PLAN   Admit to [facility] hospitalist       New Medications     New Medications Ordered This Visit   Medications    sodium chloride 0.9 % bolus 1,000 mL    glucagon (Glucagen) injection 1 mg    alum-mag hydroxide-simeth (Mylanta) 200-200-20 mg/5 mL oral suspension 30 mL    lidocaine (Xylocaine) 2 % mouth solution 15 mL    nicotine (Nicoderm CQ) 14 mg/24 hr patch     Sig:  Place 1 patch over 24 hours on the skin once daily.     Dispense:  30 patch     Refill:  0    pantoprazole (ProtoNix) 40 mg EC tablet     Sig: Take 1 tablet (40 mg) by mouth once daily in the morning. Take before meals. Do not crush, chew, or split.     Dispense:  30 tablet     Refill:  0     Electronically signed by Sabrina Schumacher, KELSI-CNP     **This report was transcribed using voice recognition software. Every effort was made to ensure accuracy; however, inadvertent computerized transcription errors may be present.  END OF ED PROVIDER NOTE

## 2024-08-14 ENCOUNTER — ANESTHESIA (OUTPATIENT)
Dept: OPERATING ROOM | Facility: HOSPITAL | Age: 29
End: 2024-08-14
Payer: COMMERCIAL

## 2024-08-14 ENCOUNTER — APPOINTMENT (OUTPATIENT)
Dept: OPERATING ROOM | Facility: HOSPITAL | Age: 29
End: 2024-08-14
Payer: COMMERCIAL

## 2024-08-14 ENCOUNTER — HOSPITAL ENCOUNTER (OUTPATIENT)
Dept: CARDIOLOGY | Facility: HOSPITAL | Age: 29
Discharge: HOME | End: 2024-08-14
Payer: COMMERCIAL

## 2024-08-14 ENCOUNTER — ANESTHESIA EVENT (OUTPATIENT)
Dept: OPERATING ROOM | Facility: HOSPITAL | Age: 29
End: 2024-08-14
Payer: COMMERCIAL

## 2024-08-14 VITALS
WEIGHT: 210.7 LBS | BODY MASS INDEX: 29.5 KG/M2 | TEMPERATURE: 97.5 F | HEART RATE: 71 BPM | SYSTOLIC BLOOD PRESSURE: 122 MMHG | OXYGEN SATURATION: 96 % | HEIGHT: 71 IN | DIASTOLIC BLOOD PRESSURE: 76 MMHG | RESPIRATION RATE: 16 BRPM

## 2024-08-14 LAB
ABO GROUP (TYPE) IN BLOOD: NORMAL
ANTIBODY SCREEN: NORMAL
RH FACTOR (ANTIGEN D): NORMAL

## 2024-08-14 PROCEDURE — 36415 COLL VENOUS BLD VENIPUNCTURE: CPT | Performed by: NURSE PRACTITIONER

## 2024-08-14 PROCEDURE — G0378 HOSPITAL OBSERVATION PER HR: HCPCS

## 2024-08-14 PROCEDURE — 7100000002 HC RECOVERY ROOM TIME - EACH INCREMENTAL 1 MINUTE: Performed by: ANESTHESIOLOGY

## 2024-08-14 PROCEDURE — 2500000004 HC RX 250 GENERAL PHARMACY W/ HCPCS (ALT 636 FOR OP/ED): Performed by: NURSE PRACTITIONER

## 2024-08-14 PROCEDURE — 2500000004 HC RX 250 GENERAL PHARMACY W/ HCPCS (ALT 636 FOR OP/ED): Performed by: NURSE ANESTHETIST, CERTIFIED REGISTERED

## 2024-08-14 PROCEDURE — 2500000004 HC RX 250 GENERAL PHARMACY W/ HCPCS (ALT 636 FOR OP/ED): Performed by: INTERNAL MEDICINE

## 2024-08-14 PROCEDURE — 3700000001 HC GENERAL ANESTHESIA TIME - INITIAL BASE CHARGE: Performed by: ANESTHESIOLOGY

## 2024-08-14 PROCEDURE — 0753T DGTZ GLS MCRSCP SLD LEVEL IV: CPT | Mod: TC,GEALAB,WESLAB | Performed by: INTERNAL MEDICINE

## 2024-08-14 PROCEDURE — 96372 THER/PROPH/DIAG INJ SC/IM: CPT | Performed by: NURSE ANESTHETIST, CERTIFIED REGISTERED

## 2024-08-14 PROCEDURE — 43239 EGD BIOPSY SINGLE/MULTIPLE: CPT | Performed by: INTERNAL MEDICINE

## 2024-08-14 PROCEDURE — 3600000002 HC OR TIME - INITIAL BASE CHARGE - PROCEDURE LEVEL TWO: Performed by: ANESTHESIOLOGY

## 2024-08-14 PROCEDURE — 2500000005 HC RX 250 GENERAL PHARMACY W/O HCPCS: Performed by: NURSE ANESTHETIST, CERTIFIED REGISTERED

## 2024-08-14 PROCEDURE — 86901 BLOOD TYPING SEROLOGIC RH(D): CPT | Performed by: NURSE PRACTITIONER

## 2024-08-14 PROCEDURE — 3600000007 HC OR TIME - EACH INCREMENTAL 1 MINUTE - PROCEDURE LEVEL TWO: Performed by: ANESTHESIOLOGY

## 2024-08-14 PROCEDURE — 2720000007 HC OR 272 NO HCPCS: Performed by: ANESTHESIOLOGY

## 2024-08-14 PROCEDURE — 96361 HYDRATE IV INFUSION ADD-ON: CPT | Mod: 59

## 2024-08-14 PROCEDURE — 99253 IP/OBS CNSLTJ NEW/EST LOW 45: CPT | Performed by: INTERNAL MEDICINE

## 2024-08-14 PROCEDURE — 3700000002 HC GENERAL ANESTHESIA TIME - EACH INCREMENTAL 1 MINUTE: Performed by: ANESTHESIOLOGY

## 2024-08-14 PROCEDURE — 99239 HOSP IP/OBS DSCHRG MGMT >30: CPT | Performed by: FAMILY MEDICINE

## 2024-08-14 PROCEDURE — 96375 TX/PRO/DX INJ NEW DRUG ADDON: CPT | Mod: 59

## 2024-08-14 PROCEDURE — S4991 NICOTINE PATCH NONLEGEND: HCPCS | Performed by: NURSE PRACTITIONER

## 2024-08-14 PROCEDURE — 7100000001 HC RECOVERY ROOM TIME - INITIAL BASE CHARGE: Performed by: ANESTHESIOLOGY

## 2024-08-14 PROCEDURE — 2500000002 HC RX 250 W HCPCS SELF ADMINISTERED DRUGS (ALT 637 FOR MEDICARE OP, ALT 636 FOR OP/ED): Performed by: NURSE PRACTITIONER

## 2024-08-14 PROCEDURE — 43247 EGD REMOVE FOREIGN BODY: CPT | Performed by: INTERNAL MEDICINE

## 2024-08-14 PROCEDURE — 93005 ELECTROCARDIOGRAM TRACING: CPT

## 2024-08-14 RX ORDER — IBUPROFEN 200 MG
1 TABLET ORAL DAILY
Qty: 30 PATCH | Refills: 0 | Status: SHIPPED | OUTPATIENT
Start: 2024-08-15 | End: 2024-09-14

## 2024-08-14 RX ORDER — PANTOPRAZOLE SODIUM 40 MG/1
40 TABLET, DELAYED RELEASE ORAL
Qty: 30 TABLET | Refills: 0 | Status: SHIPPED | OUTPATIENT
Start: 2024-08-14 | End: 2024-09-13

## 2024-08-14 RX ORDER — SUCCINYLCHOLINE CHLORIDE 20 MG/ML
INJECTION INTRAMUSCULAR; INTRAVENOUS AS NEEDED
Status: DISCONTINUED | OUTPATIENT
Start: 2024-08-14 | End: 2024-08-14

## 2024-08-14 RX ORDER — FENTANYL CITRATE 50 UG/ML
INJECTION, SOLUTION INTRAMUSCULAR; INTRAVENOUS AS NEEDED
Status: DISCONTINUED | OUTPATIENT
Start: 2024-08-14 | End: 2024-08-14

## 2024-08-14 RX ORDER — SODIUM CHLORIDE, SODIUM LACTATE, POTASSIUM CHLORIDE, CALCIUM CHLORIDE 600; 310; 30; 20 MG/100ML; MG/100ML; MG/100ML; MG/100ML
20 INJECTION, SOLUTION INTRAVENOUS CONTINUOUS
Status: DISCONTINUED | OUTPATIENT
Start: 2024-08-14 | End: 2024-08-14

## 2024-08-14 RX ORDER — ROCURONIUM BROMIDE 10 MG/ML
INJECTION, SOLUTION INTRAVENOUS AS NEEDED
Status: DISCONTINUED | OUTPATIENT
Start: 2024-08-14 | End: 2024-08-14

## 2024-08-14 RX ORDER — MIDAZOLAM HYDROCHLORIDE 1 MG/ML
INJECTION, SOLUTION INTRAMUSCULAR; INTRAVENOUS AS NEEDED
Status: DISCONTINUED | OUTPATIENT
Start: 2024-08-14 | End: 2024-08-14

## 2024-08-14 RX ORDER — PROPOFOL 10 MG/ML
INJECTION, EMULSION INTRAVENOUS AS NEEDED
Status: DISCONTINUED | OUTPATIENT
Start: 2024-08-14 | End: 2024-08-14

## 2024-08-14 RX ORDER — MIDAZOLAM HYDROCHLORIDE 5 MG/ML
INJECTION INTRAMUSCULAR; INTRAVENOUS AS NEEDED
Status: DISCONTINUED | OUTPATIENT
Start: 2024-08-14 | End: 2024-08-14

## 2024-08-14 RX ORDER — LIDOCAINE HYDROCHLORIDE 20 MG/ML
INJECTION, SOLUTION INFILTRATION; PERINEURAL AS NEEDED
Status: DISCONTINUED | OUTPATIENT
Start: 2024-08-14 | End: 2024-08-14

## 2024-08-14 SDOH — HEALTH STABILITY: MENTAL HEALTH: CURRENT SMOKER: 0

## 2024-08-14 SDOH — SOCIAL STABILITY: SOCIAL INSECURITY: DO YOU FEEL ANYONE HAS EXPLOITED OR TAKEN ADVANTAGE OF YOU FINANCIALLY OR OF YOUR PERSONAL PROPERTY?: NO

## 2024-08-14 SDOH — SOCIAL STABILITY: SOCIAL INSECURITY: DO YOU FEEL UNSAFE GOING BACK TO THE PLACE WHERE YOU ARE LIVING?: NO

## 2024-08-14 SDOH — SOCIAL STABILITY: SOCIAL INSECURITY: ARE YOU OR HAVE YOU BEEN THREATENED OR ABUSED PHYSICALLY, EMOTIONALLY, OR SEXUALLY BY ANYONE?: NO

## 2024-08-14 SDOH — SOCIAL STABILITY: SOCIAL INSECURITY: DOES ANYONE TRY TO KEEP YOU FROM HAVING/CONTACTING OTHER FRIENDS OR DOING THINGS OUTSIDE YOUR HOME?: NO

## 2024-08-14 SDOH — SOCIAL STABILITY: SOCIAL INSECURITY: HAVE YOU HAD ANY THOUGHTS OF HARMING ANYONE ELSE?: NO

## 2024-08-14 SDOH — SOCIAL STABILITY: SOCIAL INSECURITY: ARE THERE ANY APPARENT SIGNS OF INJURIES/BEHAVIORS THAT COULD BE RELATED TO ABUSE/NEGLECT?: NO

## 2024-08-14 SDOH — SOCIAL STABILITY: SOCIAL INSECURITY: ABUSE: ADULT

## 2024-08-14 SDOH — SOCIAL STABILITY: SOCIAL INSECURITY: WERE YOU ABLE TO COMPLETE ALL THE BEHAVIORAL HEALTH SCREENINGS?: YES

## 2024-08-14 SDOH — SOCIAL STABILITY: SOCIAL INSECURITY: HAVE YOU HAD THOUGHTS OF HARMING ANYONE ELSE?: NO

## 2024-08-14 SDOH — SOCIAL STABILITY: SOCIAL INSECURITY: HAS ANYONE EVER THREATENED TO HURT YOUR FAMILY OR YOUR PETS?: NO

## 2024-08-14 ASSESSMENT — PAIN SCALES - GENERAL
PAINLEVEL_OUTOF10: 0 - NO PAIN

## 2024-08-14 ASSESSMENT — COGNITIVE AND FUNCTIONAL STATUS - GENERAL
MOBILITY SCORE: 24
DAILY ACTIVITIY SCORE: 24
PATIENT BASELINE BEDBOUND: NO
DAILY ACTIVITIY SCORE: 24
MOBILITY SCORE: 24
MOBILITY SCORE: 24
DAILY ACTIVITIY SCORE: 24

## 2024-08-14 ASSESSMENT — ACTIVITIES OF DAILY LIVING (ADL)
HEARING - RIGHT EAR: FUNCTIONAL
ASSISTIVE_DEVICE: EYEGLASSES
WALKS IN HOME: INDEPENDENT
TOILETING: INDEPENDENT
BATHING: INDEPENDENT
DRESSING YOURSELF: INDEPENDENT
JUDGMENT_ADEQUATE_SAFELY_COMPLETE_DAILY_ACTIVITIES: YES
HEARING - LEFT EAR: FUNCTIONAL
ADEQUATE_TO_COMPLETE_ADL: YES
FEEDING YOURSELF: INDEPENDENT
LACK_OF_TRANSPORTATION: NO
GROOMING: INDEPENDENT
PATIENT'S MEMORY ADEQUATE TO SAFELY COMPLETE DAILY ACTIVITIES?: YES

## 2024-08-14 ASSESSMENT — LIFESTYLE VARIABLES
AUDIT-C TOTAL SCORE: 2
SKIP TO QUESTIONS 9-10: 0
HOW MANY STANDARD DRINKS CONTAINING ALCOHOL DO YOU HAVE ON A TYPICAL DAY: 1 OR 2
AUDIT-C TOTAL SCORE: 2
HOW OFTEN DO YOU HAVE 6 OR MORE DRINKS ON ONE OCCASION: LESS THAN MONTHLY
HOW OFTEN DO YOU HAVE A DRINK CONTAINING ALCOHOL: MONTHLY OR LESS

## 2024-08-14 ASSESSMENT — PAIN - FUNCTIONAL ASSESSMENT
PAIN_FUNCTIONAL_ASSESSMENT: 0-10
PAIN_FUNCTIONAL_ASSESSMENT: 0-10

## 2024-08-14 ASSESSMENT — PATIENT HEALTH QUESTIONNAIRE - PHQ9
SUM OF ALL RESPONSES TO PHQ9 QUESTIONS 1 & 2: 0
1. LITTLE INTEREST OR PLEASURE IN DOING THINGS: NOT AT ALL
2. FEELING DOWN, DEPRESSED OR HOPELESS: NOT AT ALL

## 2024-08-14 NOTE — H&P
History Of Present Illness  Markie Mukherjee is a 29 y.o. male with a pertinent medical history of past foreign object in esophagus & GERD who presented to [facility] ER with complaint of difficulty swallowing due to foreign object in esophagus. He said he ate a chicken sandwich around 1 AM yesterday morning and it got caught in his throat. He has been unable to expel this. He was given glucagon once in the ER without resolution. MD Fantasma spoke with Dr. Warner who agreed to scope in the morning. He denies difficulty breathing, nausea, vomiting, chest pain or abdominal pain or any other complaint int the review of systems. Labs are unremarkable.     Past Medical History  GERD (not on meds for), previous episode of foreign object in esophagus, chronic back pain, staphylococcal infection.    Surgical History  EGD, tooth extraction, cyst removal, knee surgery.     Social History  Patient reports he vapes daily.  He has never used smokeless tobacco.  Patient drinks alcohol on a rare occasion.  Denies any drug use.    Family History  Diabetes-father     Allergies  Patient has no known allergies.    Review of Systems   Constitutional:  Negative for chills and fever.   HENT:  Negative for rhinorrhea.         +difficulty swallowing even liquids   Eyes:  Negative for redness and itching.   Respiratory:  Negative for cough and shortness of breath.    Cardiovascular:  Negative for chest pain and palpitations.   Gastrointestinal:  Negative for abdominal pain, constipation, diarrhea, nausea and vomiting.   Genitourinary:  Negative for dysuria and urgency.   Musculoskeletal:  Negative for neck pain and neck stiffness.   Skin:  Negative for color change and pallor.   Neurological:  Negative for dizziness and headaches.   Psychiatric/Behavioral:  Negative for agitation, behavioral problems and confusion.      Physical Exam  Vitals reviewed.   Constitutional:       General: He is not in acute distress.     Appearance: He  is obese. He is not ill-appearing, toxic-appearing or diaphoretic.   HENT:      Head: Normocephalic and atraumatic.      Mouth/Throat:      Mouth: Mucous membranes are moist.      Pharynx: Oropharynx is clear.   Eyes:      Extraocular Movements: Extraocular movements intact.      Conjunctiva/sclera: Conjunctivae normal.   Cardiovascular:      Rate and Rhythm: Normal rate and regular rhythm.      Pulses: Normal pulses.      Heart sounds: No murmur heard.  Pulmonary:      Effort: Pulmonary effort is normal. No respiratory distress.      Breath sounds: Normal breath sounds. No wheezing, rhonchi or rales.   Abdominal:      General: There is no distension.      Palpations: Abdomen is soft.      Tenderness: There is no abdominal tenderness. There is no guarding or rebound.      Comments: BS hypoactive x's 4   Musculoskeletal:         General: No swelling. Normal range of motion.      Cervical back: Normal range of motion.      Right lower leg: No edema.      Left lower leg: No edema.   Skin:     General: Skin is warm and dry.      Coloration: Skin is pale.   Neurological:      General: No focal deficit present.      Mental Status: He is alert and oriented to person, place, and time. Mental status is at baseline.      Motor: No weakness.   Psychiatric:         Mood and Affect: Mood normal.         Behavior: Behavior normal.       Last Recorded Vitals  /78 (BP Location: Right arm, Patient Position: Lying)   Pulse 87   Temp 36.9 °C (98.4 °F) (Temporal)   Resp 18   Wt 99.8 kg (220 lb)   SpO2 97%     Relevant Results  Scheduled medications  [START ON 8/14/2024] nicotine, 1 patch, transdermal, Daily  [START ON 8/14/2024] pantoprazole, 40 mg, intravenous, Daily      Continuous medications  D5 % and 0.9 % sodium chloride, 100 mL/hr      PRN medications    Results for orders placed or performed during the hospital encounter of 08/13/24 (from the past 24 hour(s))   CBC and Auto Differential   Result Value Ref Range     WBC 8.2 4.4 - 11.3 x10*3/uL    nRBC 0.0 0.0 - 0.0 /100 WBCs    RBC 5.27 4.50 - 5.90 x10*6/uL    Hemoglobin 16.8 13.5 - 17.5 g/dL    Hematocrit 46.7 41.0 - 52.0 %    MCV 89 80 - 100 fL    MCH 31.9 26.0 - 34.0 pg    MCHC 36.0 32.0 - 36.0 g/dL    RDW 11.6 11.5 - 14.5 %    Platelets 251 150 - 450 x10*3/uL    Neutrophils % 64.4 40.0 - 80.0 %    Immature Granulocytes %, Automated 0.2 0.0 - 0.9 %    Lymphocytes % 27.9 13.0 - 44.0 %    Monocytes % 6.0 2.0 - 10.0 %    Eosinophils % 0.9 0.0 - 6.0 %    Basophils % 0.6 0.0 - 2.0 %    Neutrophils Absolute 5.30 1.20 - 7.70 x10*3/uL    Immature Granulocytes Absolute, Automated 0.02 0.00 - 0.70 x10*3/uL    Lymphocytes Absolute 2.29 1.20 - 4.80 x10*3/uL    Monocytes Absolute 0.49 0.10 - 1.00 x10*3/uL    Eosinophils Absolute 0.07 0.00 - 0.70 x10*3/uL    Basophils Absolute 0.05 0.00 - 0.10 x10*3/uL   Magnesium   Result Value Ref Range    Magnesium 2.11 1.60 - 2.40 mg/dL   Comprehensive metabolic panel   Result Value Ref Range    Glucose 77 74 - 99 mg/dL    Sodium 139 136 - 145 mmol/L    Potassium 3.9 3.5 - 5.3 mmol/L    Chloride 103 98 - 107 mmol/L    Bicarbonate 26 21 - 32 mmol/L    Anion Gap 14 10 - 20 mmol/L    Urea Nitrogen 11 6 - 23 mg/dL    Creatinine 0.98 0.50 - 1.30 mg/dL    eGFR >90 >60 mL/min/1.73m*2    Calcium 8.7 8.6 - 10.3 mg/dL    Albumin 4.2 3.4 - 5.0 g/dL    Alkaline Phosphatase 56 33 - 120 U/L    Total Protein 6.7 6.4 - 8.2 g/dL    AST 25 9 - 39 U/L    Bilirubin, Total 1.4 (H) 0.0 - 1.2 mg/dL    ALT 45 10 - 52 U/L   aPTT   Result Value Ref Range    aPTT 34 27 - 38 seconds   Protime-INR   Result Value Ref Range    Protime 12.4 9.8 - 12.8 seconds    INR 1.1 0.9 - 1.1     No results found.    Assessment/Plan   Principal Problem:  Other foreign object in esophagus causing other injury, sequela  -RCRI score low to proceed to OR  Glucagon in ER once and will re-attempt  GI consulted and agreed to scope  IV fluids/NPO  Supportive care    GERD  PPI started    Nicotine Use  Disorder  Smoking cessation education  Nicotine replacements    Full code    DVT PX  SCDs    Tricia Newman, APRN-CNP

## 2024-08-14 NOTE — ANESTHESIA POSTPROCEDURE EVALUATION
Patient: Markie Mukherjee    Procedure Summary       Date: 08/14/24 Room / Location: [facility] OR    Anesthesia Start: 1218 Anesthesia Stop: 1316    Procedure: EGD Diagnosis: Other foreign object in esophagus causing other injury, sequela    Scheduled Providers: Warren Warner MD; Samuel Yost MD Responsible Provider: Samuel Yost MD    Anesthesia Type: general ASA Status: 2            Anesthesia Type: general    Vitals Value Taken Time   /72 08/14/24 1313   Temp 36.5 °C (97.7 °F) 08/14/24 1313   Pulse 68 08/14/24 1313   Resp 15 08/14/24 1313   SpO2 95 % 08/14/24 1313       Anesthesia Post Evaluation    Patient location during evaluation: PACU  Patient participation: complete - patient participated  Level of consciousness: awake  Pain management: adequate  Multimodal analgesia pain management approach  Airway patency: patent  Two or more strategies used to mitigate risk of obstructive sleep apnea  Cardiovascular status: acceptable  Respiratory status: acceptable  Hydration status: acceptable  Postoperative Nausea and Vomiting: none        No notable events documented.

## 2024-08-14 NOTE — CARE PLAN
The patient's goals for the shift include      The clinical goals for the shift include Maintain patient comfort

## 2024-08-14 NOTE — CONSULTS
Inpatient consult to Gastroenterology  Consult performed by: Sylvester Fortune DO  Consult ordered by: KELSI Giraldo-CNP  Reason for consult: FB obstruction, consult for EGD      Patient: Markie Mukherjee   Age: 29 y.o.   Gender: male   Room/Bed: 128/128-A     Attending: Thea Whittaker DO  Code Status:  Full Code    History of Presenting Illness    Patient: Markie Mukherjee is a 29 y.o. male who presented to the hospital for FB obstruction of the esophagus. Pmhx of obstruction, EGD on 1/23 revealed EoE, no stricture.  He said he ate a chicken sandwich around 1 AM yesterday morning and it got caught in his throat. He has been unable to expel this. He his currently unable to tolerate orals including liquids or solids. He denies difficulty breathing, nausea, vomiting, chest pain or abdominal pain or any other complaint int the review of systems. He denies hx of GERD, dysphagia, odynphagia, chest pain or food aversion due to hx of EoE. Labs are unremarkable.     Past Medical History   History reviewed. No pertinent past medical history.   Past Surgical History:   Past Surgical History:   Procedure Laterality Date    OTHER SURGICAL HISTORY  02/16/2021    Knee surgery     Family History:   No family history on file.  Social History:   Tobacco Use: Low Risk  (8/13/2024)    Patient History     Smoking Tobacco Use: Never     Smokeless Tobacco Use: Never     Passive Exposure: Not on file      Social History     Substance and Sexual Activity   Alcohol Use None       Outpatient Medications:  Current Outpatient Medications   Medication Instructions    cyclobenzaprine (FLEXERIL) 10 mg, oral, Nightly    meloxicam (MOBIC) 15 mg, oral, Daily    methocarbamol (Robaxin) 500 mg tablet Take 1-2 tabs every 8 hours as needed for spasms    polyethylene glycol (MIRALAX) 17 g, oral, 2 times daily    sennosides-docusate sodium (Izabel-Colace) 8.6-50 mg tablet 1 tablet, oral, Daily        ED Course   Vitals - /69 (BP Location: Right arm,  "Patient Position: Lying)   Pulse 60   Temp 36.7 °C (98.1 °F) (Temporal)   Resp 16   Wt 95.6 kg (210 lb 11.2 oz)   SpO2 95%     Labs:   CBC:  Recent Labs     08/13/24 2139 09/22/20  0557 09/21/20  0551   WBC 8.2 7.5 11.9*   HGB 16.8 14.1 14.7   HCT 46.7 42.0 44.4    223 227   MCV 89 94 94     CMP:  Recent Labs     08/13/24 2139 09/23/20  0630 09/22/20  0557 09/21/20  0551     --  139 137   K 3.9  --  4.5 4.0     --  108* 106   CO2 26  --  25 24   ANIONGAP 14  --  11 11   BUN 11  --  8 9   CREATININE 0.98 0.94 1.09 1.24   EGFR >90  --   --   --    GLUCOSE 77  --  92 92     Recent Labs     08/13/24 2139 09/20/20  1903 11/02/18  1831   ALBUMIN 4.2 4.5 4.5   ALKPHOS 56  --  58   ALT 45  --  26   AST 25  --  21   BILITOT 1.4*  --  1.0     Calcium/Phos:   Lab Results   Component Value Date    CALCIUM 8.7 08/13/2024    PHOS 3.3 09/20/2020      COAG:   Recent Labs     08/13/24 2139   INR 1.1     CRP:   Lab Results   Component Value Date    CRP 4.10 (A) 09/20/2020      [unfilled]   ENDO:No results for input(s): \"TSH\", \"HGBA1C\" in the last 04278 hours.   CARDIAC: No results for input(s): \"LDH\", \"CKMB\", \"TROPHS\", \"BNP\" in the last 73690 hours.    No lab exists for component: \"CK\", \"CKMBP\"No results for input(s): \"CHOL\", \"LDLF\", \"LDL\", \"LDLCALC\", \"HDL\", \"TRIG\" in the last 30231 hours.  No data recorded    Micro/ID:   No results found for the last 90 days.                   No lab exists for component: \"AGALPCRNB\"   .ID  No results found for: \"URINECULTURE\", \"BLOODCULT\", \"CSFCULTSMEAR\"    Imaging:   No orders to display   No results found for this or any previous visit (from the past 4464 hour(s)).  No echocardiogram results found for the past 12 months  No results found.    Interventions:  Medications   pantoprazole (ProtoNix) injection 40 mg (has no administration in time range)   nicotine (Nicoderm CQ) 14 mg/24 hr patch 1 patch (1 patch transdermal Medication Applied 8/14/24 0120)   D5 % and " 0.9 % sodium chloride infusion (100 mL/hr intravenous Rate/Dose Verify 8/14/24 0057)   sodium chloride 0.9 % bolus 1,000 mL (0 mL intravenous Stopped 8/13/24 1858)   glucagon (Glucagen) injection 1 mg (1 mg intravenous Given 8/13/24 1829)   alum-mag hydroxide-simeth (Mylanta) 200-200-20 mg/5 mL oral suspension 30 mL (30 mL oral Given 8/13/24 1829)   lidocaine (Xylocaine) 2 % mouth solution 15 mL (15 mL Mouth/Throat Given 8/13/24 1829)       Objective Data  Physical Exam  HENT:      Head: Normocephalic and atraumatic.      Nose: Nose normal.      Mouth/Throat:      Mouth: Mucous membranes are moist.   Cardiovascular:      Rate and Rhythm: Normal rate and regular rhythm.      Pulses: Normal pulses.      Heart sounds: Normal heart sounds.   Pulmonary:      Effort: Pulmonary effort is normal.      Breath sounds: Normal breath sounds.   Abdominal:      General: Abdomen is flat. Bowel sounds are normal.      Palpations: Abdomen is soft.   Skin:     General: Skin is warm and dry.   Neurological:      Mental Status: He is alert.   Psychiatric:         Mood and Affect: Mood normal.                Assessment and Plan   Markie Mukherjee is a 29 y.o. male presenting for esophageal food impaction    Assessment:   #esophogeal food impaction with suspected complete obstruction. No response to glucagon   #hx of EoE, no stricture found on EGD 1/23     Recommendations:   -plan for EGD today  -npo, ppi   -consider AI referral on dc for EoE/food allergy and avoidance     Sylvester Fortune, DO  PGY-3, Internal Medicine    I saw and evaluated the patient. I personally obtained the key and critical portions of the history and physical exam or was physically present for key and critical portions performed by the resident. I reviewed the resident's documentation and discussed the patient with the resident. I agree with the resident's medical decision making as documented in the note.      History of eosinophilic esophagitis endoscopically  proven/symptomatically although not biopsy proven admitted with food bolus.  EGD today -impacted food bolus in lower esophagus removed with snare, Paco grasping device.  Feature of eosinophilic esophagitis.  Biopsy taken.  There was stricture at the site of the food impaction.    Patient can be DC'd.  Follow-up with GI.  Consider referral to allergy immunology.  Continue PPI.  Repeat EGD with further dilation as needed.  May consider to treat with dupilumab depending on insurance approval.

## 2024-08-14 NOTE — PROGRESS NOTES
08/14/24 1244   Discharge Planning   Living Arrangements Spouse/significant other;Children   Support Systems Spouse/significant other;Children   Assistance Needed patient is A&Ox4, independent in ADL's, no AD, no DME, drives   Type of Residence Private residence   Number of Stairs Within Residence 24   Do you have animals or pets at home? Yes   Type of Animals or Pets fish, turtles   Who is requesting discharge planning? Provider   Home or Post Acute Services None   Expected Discharge Disposition Home   Does the patient need discharge transport arranged? No

## 2024-08-14 NOTE — DISCHARGE SUMMARY
Discharge Diagnosis  Choking, initial encounter    Issues Requiring Follow-Up  GI follow up for foreign body in esophagus    Discharge Meds     Your medication list        START taking these medications        Instructions Last Dose Given Next Dose Due   nicotine 14 mg/24 hr patch  Commonly known as: Nicoderm CQ  Start taking on: August 15, 2024      Place 1 patch over 24 hours on the skin once daily.       pantoprazole 40 mg EC tablet  Commonly known as: ProtoNix      Take 1 tablet (40 mg) by mouth once daily in the morning. Take before meals. Do not crush, chew, or split.              CONTINUE taking these medications        Instructions Last Dose Given Next Dose Due   polyethylene glycol 17 gram/dose powder  Commonly known as: Miralax      Take 17 g by mouth 2 times a day.              STOP taking these medications      cyclobenzaprine 10 mg tablet  Commonly known as: Flexeril        meloxicam 15 mg tablet  Commonly known as: Mobic        methocarbamol 500 mg tablet  Commonly known as: Robaxin        sennosides-docusate sodium 8.6-50 mg tablet  Commonly known as: Izabel-Colace                  Where to Get Your Medications        These medications were sent to [place] [address]      Phone: [phone]   nicotine 14 mg/24 hr patch  pantoprazole 40 mg EC tablet         Test Results Pending At Discharge  Pending Labs       Order Current Status    Surgical Pathology Exam In process            Hospital Course   30yo CM with PMH of GERD and past foreign object in esophagus that presented to the ED with foreign body sensation. He was seen by GI and underwent endoscopy on 8/14 with removal of food bolus in middle third of esophagus. Patient tolerated liquid diet and was instructed to maintain liquid diet for two days as well as daily PPI. Patient tolerated diet post-procedure and was cleared for discharge home when ride was available. Patient appears  medically stable for discharge.     Medically cleared for discharge. Medications reviewed and reconciled. Scripts prepared as needed.  Discussed with the family, , and . Questions answered. Understanding verbalized. Overall time spent on discharge was longer than 35 min.      Pertinent Physical Exam At Time of Discharge  Constitutional: alert and oriented x 3, awake, cooperative, no acute distress  Skin: warm and dry  Head/Neck: Normocephalic, atraumatic  Eyes: clear sclera  ENMT: mucous membranes moist  Cardio: Regular rate and rhythm  Resp: CTA bilaterally, good respiratory effort  Gastrointestinal: Soft, nontender, nondistended  Musculoskeletal: ROM intact, no joint swelling  Extremities: No edema, cyanosis, or clubbing  Neuro: alert and oriented x 3  Psychological: Appropriate mood and behavior      Outpatient Follow-Up  No future appointments.      Thea Whittaker DO

## 2024-08-14 NOTE — ANESTHESIA PROCEDURE NOTES
Airway  Date/Time: 8/14/2024 12:24 PM  Urgency: elective    Airway not difficult    Staffing  Performed: CRNA   Authorized by: Samuel Yost MD    Performed by: KELSI Young-RENETTA  Patient location during procedure: OR    Indications and Patient Condition  Indications for airway management: anesthesia  Spontaneous Ventilation: absent  Sedation level: deep  Preoxygenated: yes  Patient position: sniffing  Mask difficulty assessment: 1 - vent by mask    Final Airway Details  Final airway type: endotracheal airway      Successful airway: ETT  Cuffed: yes   Successful intubation technique: video laryngoscopy  Facilitating devices/methods: intubating stylet  Endotracheal tube insertion site: oral  Blade size: #4  ETT size (mm): 7.0  Cormack-Lehane Classification: grade I - full view of glottis  Number of attempts at approach: 1

## 2024-08-14 NOTE — ANESTHESIA PREPROCEDURE EVALUATION
Patient: Markie Mukherjee    Procedure Information       Date/Time: 08/14/24 1400    Scheduled providers: Warren Warner MD; Samuel Yost MD    Procedure: EGD    Location: [facility] OR          Vitals:    08/14/24 1140   BP: 150/81   Pulse: 56   Resp: 16   Temp: 36.6 °C (97.9 °F)   SpO2: 96%       Past Surgical History:   Procedure Laterality Date    OTHER SURGICAL HISTORY  02/16/2021    Knee surgery     History reviewed. No pertinent past medical history.    Current Facility-Administered Medications:     D5 % and 0.9 % sodium chloride infusion, 100 mL/hr, intravenous, Continuous, Tricia Newman APRN-CNP, Last Rate: 100 mL/hr at 08/14/24 0057, 100 mL/hr at 08/14/24 0057    nicotine (Nicoderm CQ) 14 mg/24 hr patch 1 patch, 1 patch, transdermal, Daily, Tricia Newman APRN-CNP, 1 patch at 08/14/24 0120    pantoprazole (ProtoNix) injection 40 mg, 40 mg, intravenous, Daily, Sabrina Schumacher APRN-CNP, 40 mg at 08/14/24 0947  Prior to Admission medications    Medication Sig Start Date End Date Taking? Authorizing Provider   cyclobenzaprine (Flexeril) 10 mg tablet Take 1 tablet (10 mg) by mouth once daily at bedtime for 7 days. 12/2/23 12/9/23  Reji Neal,    meloxicam (Mobic) 15 mg tablet Take 1 tablet (15 mg) by mouth once daily. 12/7/23   Ingrid Renteria PA-C   methocarbamol (Robaxin) 500 mg tablet Take 1-2 tabs every 8 hours as needed for spasms 12/7/23   Ingrid Renteria PA-C   polyethylene glycol (Miralax) 17 gram/dose powder Take 17 g by mouth 2 times a day. 12/28/23   Awa Sultana DO   sennosides-docusate sodium (Izabel-Colace) 8.6-50 mg tablet Take 1 tablet by mouth once daily. 12/28/23 12/27/24  Awa Sultana DO     No Known Allergies  Social History     Tobacco Use    Smoking status: Never    Smokeless tobacco: Never   Substance Use Topics    Alcohol use: Not on file         Chemistry    Lab Results   Component Value Date/Time     08/13/2024 2139    K 3.9 08/13/2024 2139      08/13/2024 2139    CO2 26 08/13/2024 2139    BUN 11 08/13/2024 2139    CREATININE 0.98 08/13/2024 2139    Lab Results   Component Value Date/Time    CALCIUM 8.7 08/13/2024 2139    ALKPHOS 56 08/13/2024 2139    AST 25 08/13/2024 2139    ALT 45 08/13/2024 2139    BILITOT 1.4 (H) 08/13/2024 2139          Lab Results   Component Value Date/Time    WBC 8.2 08/13/2024 2139    HGB 16.8 08/13/2024 2139    HCT 46.7 08/13/2024 2139     08/13/2024 2139     Lab Results   Component Value Date/Time    PROTIME 12.4 08/13/2024 2139    INR 1.1 08/13/2024 2139     No results found for this or any previous visit (from the past 4464 hour(s)).  No results found for this or any previous visit from the past 1095 days.    Relevant Problems   No relevant active problems       Clinical information reviewed:   Tobacco  Allergies  Meds  Problems  Med Hx  Surg Hx   Fam Hx  Soc   Hx        NPO Detail:  No data recorded     Physical Exam    Airway  Mallampati: II     Cardiovascular - normal exam     Dental    Pulmonary    Abdominal            Anesthesia Plan    History of general anesthesia?: yes  History of complications of general anesthesia?: no    ASA 2     general     The patient is not a current smoker.  Patient was not previously instructed to abstain from smoking on day of procedure.  Patient did not smoke on day of procedure.  Education provided regarding risk of obstructive sleep apnea.  intravenous induction   Anesthetic plan and risks discussed with patient.    Plan discussed with CRNA.

## 2024-08-15 ENCOUNTER — TELEPHONE (OUTPATIENT)
Dept: GASTROENTEROLOGY | Facility: CLINIC | Age: 29
End: 2024-08-15
Payer: COMMERCIAL

## 2024-08-15 ENCOUNTER — PATIENT OUTREACH (OUTPATIENT)
Dept: CARE COORDINATION | Facility: CLINIC | Age: 29
End: 2024-08-15
Payer: COMMERCIAL

## 2024-08-15 LAB
ATRIAL RATE: 53 BPM
P AXIS: 22 DEGREES
P OFFSET: 204 MS
P ONSET: 154 MS
PR INTERVAL: 128 MS
Q ONSET: 218 MS
QRS COUNT: 8 BEATS
QRS DURATION: 102 MS
QT INTERVAL: 442 MS
QTC CALCULATION(BAZETT): 414 MS
QTC FREDERICIA: 424 MS
R AXIS: 58 DEGREES
T AXIS: 7 DEGREES
T OFFSET: 439 MS
VENTRICULAR RATE: 53 BPM

## 2024-08-15 NOTE — TELEPHONE ENCOUNTER
----- Message from Warren Warner sent at 8/14/2024 12:19 PM EDT -----  Clinic schedule after hospital DC.

## 2024-08-15 NOTE — PROGRESS NOTES
Outreach call to patient to support a smooth transition of care from recent admission.  Spoke with patient, reviewed discharge medications, discharge instructions, assessed social needs, and provided education on importance of follow-up appointment with provider.  Will continue to monitor through transition period.  Patient verbalized that he did not want to be followed by case management.     No further follow up needed at this time.

## 2024-08-15 NOTE — CARE PLAN
The patient's goals for the shift include      The clinical goals for the shift include patient will remain comfortable until his ride arrives

## 2024-08-20 LAB
LABORATORY COMMENT REPORT: NORMAL
PATH REPORT.FINAL DX SPEC: NORMAL
PATH REPORT.GROSS SPEC: NORMAL
PATH REPORT.RELEVANT HX SPEC: NORMAL
PATH REPORT.TOTAL CANCER: NORMAL

## 2024-10-13 ENCOUNTER — HOSPITAL ENCOUNTER (OUTPATIENT)
Facility: HOSPITAL | Age: 29
Setting detail: OBSERVATION
End: 2024-10-13
Attending: EMERGENCY MEDICINE | Admitting: INTERNAL MEDICINE
Payer: COMMERCIAL

## 2024-10-13 ENCOUNTER — APPOINTMENT (OUTPATIENT)
Dept: RADIOLOGY | Facility: HOSPITAL | Age: 29
End: 2024-10-13
Payer: COMMERCIAL

## 2024-10-13 ENCOUNTER — APPOINTMENT (OUTPATIENT)
Dept: GASTROENTEROLOGY | Facility: HOSPITAL | Age: 29
End: 2024-10-13
Payer: COMMERCIAL

## 2024-10-13 ENCOUNTER — HOSPITAL ENCOUNTER (EMERGENCY)
Facility: HOSPITAL | Age: 29
Discharge: STILL ADMITTED OR ADMINISTRATIVE | End: 2024-10-13
Attending: EMERGENCY MEDICINE
Payer: COMMERCIAL

## 2024-10-13 ENCOUNTER — ANESTHESIA EVENT (OUTPATIENT)
Dept: GASTROENTEROLOGY | Facility: HOSPITAL | Age: 29
End: 2024-10-13
Payer: COMMERCIAL

## 2024-10-13 ENCOUNTER — ANESTHESIA (OUTPATIENT)
Dept: GASTROENTEROLOGY | Facility: HOSPITAL | Age: 29
End: 2024-10-13
Payer: COMMERCIAL

## 2024-10-13 VITALS
OXYGEN SATURATION: 96 % | SYSTOLIC BLOOD PRESSURE: 116 MMHG | DIASTOLIC BLOOD PRESSURE: 81 MMHG | HEART RATE: 67 BPM | BODY MASS INDEX: 31.1 KG/M2 | HEIGHT: 69 IN | WEIGHT: 210 LBS | TEMPERATURE: 97.5 F | RESPIRATION RATE: 18 BRPM

## 2024-10-13 DIAGNOSIS — K20.90 ESOPHAGITIS: ICD-10-CM

## 2024-10-13 DIAGNOSIS — T18.108A ESOPHAGEAL FOREIGN BODY, INITIAL ENCOUNTER: ICD-10-CM
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
## 2024-10-13 DIAGNOSIS — R13.10 DYSPHAGIA, UNSPECIFIED TYPE: ICD-10-CM

## 2024-10-13 DIAGNOSIS — T18.128A FOOD IMPACTION OF ESOPHAGUS, INITIAL ENCOUNTER: Primary | ICD-10-CM

## 2024-10-13 LAB
ALBUMIN SERPL BCP-MCNC: 4.8 G/DL (ref 3.4–5)
ALP SERPL-CCNC: 68 U/L (ref 33–120)
ALT SERPL W P-5'-P-CCNC: 63 U/L (ref 10–52)
ANION GAP SERPL CALC-SCNC: 11 MMOL/L (ref 10–20)
APTT PPP: 34 SECONDS (ref 27–38)
AST SERPL W P-5'-P-CCNC: 31 U/L (ref 9–39)
BASOPHILS # BLD AUTO: 0.06 X10*3/UL (ref 0–0.1)
BASOPHILS NFR BLD AUTO: 0.8 %
BILIRUB SERPL-MCNC: 1.7 MG/DL (ref 0–1.2)
BUN SERPL-MCNC: 13 MG/DL (ref 6–23)
CALCIUM SERPL-MCNC: 9.8 MG/DL (ref 8.6–10.3)
CHLORIDE SERPL-SCNC: 101 MMOL/L (ref 98–107)
CO2 SERPL-SCNC: 28 MMOL/L (ref 21–32)
CREAT SERPL-MCNC: 1.09 MG/DL (ref 0.5–1.3)
EGFRCR SERPLBLD CKD-EPI 2021: >90 ML/MIN/1.73M*2
EOSINOPHIL # BLD AUTO: 0.19 X10*3/UL (ref 0–0.7)
EOSINOPHIL NFR BLD AUTO: 2.4 %
ERYTHROCYTE [DISTWIDTH] IN BLOOD BY AUTOMATED COUNT: 11.9 % (ref 11.5–14.5)
GLUCOSE SERPL-MCNC: 84 MG/DL (ref 74–99)
HCT VFR BLD AUTO: 50.5 % (ref 41–52)
HGB BLD-MCNC: 17.6 G/DL (ref 13.5–17.5)
IMM GRANULOCYTES # BLD AUTO: 0.02 X10*3/UL (ref 0–0.7)
IMM GRANULOCYTES NFR BLD AUTO: 0.3 % (ref 0–0.9)
INR PPP: 1.1 (ref 0.9–1.1)
LYMPHOCYTES # BLD AUTO: 1.94 X10*3/UL (ref 1.2–4.8)
LYMPHOCYTES NFR BLD AUTO: 24.4 %
MCH RBC QN AUTO: 31.7 PG (ref 26–34)
MCHC RBC AUTO-ENTMCNC: 34.9 G/DL (ref 32–36)
MCV RBC AUTO: 91 FL (ref 80–100)
MONOCYTES # BLD AUTO: 0.5 X10*3/UL (ref 0.1–1)
MONOCYTES NFR BLD AUTO: 6.3 %
NEUTROPHILS # BLD AUTO: 5.23 X10*3/UL (ref 1.2–7.7)
NEUTROPHILS NFR BLD AUTO: 65.8 %
NRBC BLD-RTO: 0 /100 WBCS (ref 0–0)
PLATELET # BLD AUTO: 256 X10*3/UL (ref 150–450)
POTASSIUM SERPL-SCNC: 4.1 MMOL/L (ref 3.5–5.3)
PROT SERPL-MCNC: 7.7 G/DL (ref 6.4–8.2)
PROTHROMBIN TIME: 12 SECONDS (ref 9.8–12.8)
RBC # BLD AUTO: 5.55 X10*6/UL (ref 4.5–5.9)
SODIUM SERPL-SCNC: 136 MMOL/L (ref 136–145)
WBC # BLD AUTO: 7.9 X10*3/UL (ref 4.4–11.3)

## 2024-10-13 PROCEDURE — 99140 ANES COMP EMERGENCY COND: CPT | Performed by: STUDENT IN AN ORGANIZED HEALTH CARE EDUCATION/TRAINING PROGRAM

## 2024-10-13 PROCEDURE — 43247 EGD REMOVE FOREIGN BODY: CPT | Performed by: INTERNAL MEDICINE

## 2024-10-13 PROCEDURE — 2500000005 HC RX 250 GENERAL PHARMACY W/O HCPCS: Performed by: ANESTHESIOLOGIST ASSISTANT

## 2024-10-13 PROCEDURE — 99254 IP/OBS CNSLTJ NEW/EST MOD 60: CPT | Performed by: INTERNAL MEDICINE

## 2024-10-13 PROCEDURE — 99285 EMERGENCY DEPT VISIT HI MDM: CPT | Mod: 25

## 2024-10-13 PROCEDURE — A43239 PR EDG TRANSORAL BIOPSY SINGLE/MULTIPLE: Performed by: STUDENT IN AN ORGANIZED HEALTH CARE EDUCATION/TRAINING PROGRAM

## 2024-10-13 PROCEDURE — 2500000004 HC RX 250 GENERAL PHARMACY W/ HCPCS (ALT 636 FOR OP/ED): Performed by: ANESTHESIOLOGIST ASSISTANT

## 2024-10-13 PROCEDURE — G0378 HOSPITAL OBSERVATION PER HR: HCPCS

## 2024-10-13 PROCEDURE — 71046 X-RAY EXAM CHEST 2 VIEWS: CPT | Performed by: RADIOLOGY

## 2024-10-13 PROCEDURE — 99221 1ST HOSP IP/OBS SF/LOW 40: CPT

## 2024-10-13 PROCEDURE — 71046 X-RAY EXAM CHEST 2 VIEWS: CPT

## 2024-10-13 PROCEDURE — 43239 EGD BIOPSY SINGLE/MULTIPLE: CPT | Performed by: INTERNAL MEDICINE

## 2024-10-13 PROCEDURE — 96375 TX/PRO/DX INJ NEW DRUG ADDON: CPT

## 2024-10-13 PROCEDURE — 85025 COMPLETE CBC W/AUTO DIFF WBC: CPT | Performed by: EMERGENCY MEDICINE

## 2024-10-13 PROCEDURE — A43239 PR EDG TRANSORAL BIOPSY SINGLE/MULTIPLE: Performed by: ANESTHESIOLOGIST ASSISTANT

## 2024-10-13 PROCEDURE — 80053 COMPREHEN METABOLIC PANEL: CPT | Performed by: EMERGENCY MEDICINE

## 2024-10-13 PROCEDURE — 85610 PROTHROMBIN TIME: CPT | Performed by: EMERGENCY MEDICINE

## 2024-10-13 PROCEDURE — 36415 COLL VENOUS BLD VENIPUNCTURE: CPT | Performed by: EMERGENCY MEDICINE

## 2024-10-13 PROCEDURE — 2500000004 HC RX 250 GENERAL PHARMACY W/ HCPCS (ALT 636 FOR OP/ED): Mod: JZ,SE | Performed by: EMERGENCY MEDICINE

## 2024-10-13 PROCEDURE — 7100000001 HC RECOVERY ROOM TIME - INITIAL BASE CHARGE

## 2024-10-13 PROCEDURE — 43239 EGD BIOPSY SINGLE/MULTIPLE: CPT

## 2024-10-13 PROCEDURE — 70360 X-RAY EXAM OF NECK: CPT

## 2024-10-13 PROCEDURE — 7100000002 HC RECOVERY ROOM TIME - EACH INCREMENTAL 1 MINUTE

## 2024-10-13 PROCEDURE — 2720000007 HC OR 272 NO HCPCS

## 2024-10-13 PROCEDURE — 99284 EMERGENCY DEPT VISIT MOD MDM: CPT | Mod: 25

## 2024-10-13 PROCEDURE — 85730 THROMBOPLASTIN TIME PARTIAL: CPT | Performed by: EMERGENCY MEDICINE

## 2024-10-13 PROCEDURE — 70360 X-RAY EXAM OF NECK: CPT | Performed by: RADIOLOGY

## 2024-10-13 PROCEDURE — 2500000001 HC RX 250 WO HCPCS SELF ADMINISTERED DRUGS (ALT 637 FOR MEDICARE OP): Performed by: INTERNAL MEDICINE

## 2024-10-13 PROCEDURE — 3700000002 HC GENERAL ANESTHESIA TIME - EACH INCREMENTAL 1 MINUTE

## 2024-10-13 PROCEDURE — 96374 THER/PROPH/DIAG INJ IV PUSH: CPT

## 2024-10-13 PROCEDURE — 3700000001 HC GENERAL ANESTHESIA TIME - INITIAL BASE CHARGE

## 2024-10-13 RX ORDER — ONDANSETRON HYDROCHLORIDE 2 MG/ML
4 INJECTION, SOLUTION INTRAVENOUS ONCE AS NEEDED
Status: DISCONTINUED | OUTPATIENT
Start: 2024-10-13 | End: 2024-10-13

## 2024-10-13 RX ORDER — PANTOPRAZOLE SODIUM 40 MG/1
40 TABLET, DELAYED RELEASE ORAL
Status: DISCONTINUED | OUTPATIENT
Start: 2024-10-13 | End: 2024-10-14 | Stop reason: HOSPADM

## 2024-10-13 RX ORDER — LIDOCAINE HYDROCHLORIDE 10 MG/ML
0.1 INJECTION, SOLUTION EPIDURAL; INFILTRATION; INTRACAUDAL; PERINEURAL ONCE
Status: DISCONTINUED | OUTPATIENT
Start: 2024-10-13 | End: 2024-10-13

## 2024-10-13 RX ORDER — PROPOFOL 10 MG/ML
INJECTION, EMULSION INTRAVENOUS AS NEEDED
Status: DISCONTINUED | OUTPATIENT
Start: 2024-10-13 | End: 2024-10-13

## 2024-10-13 RX ORDER — SUCCINYLCHOLINE CHLORIDE 100 MG/5ML
SYRINGE (ML) INTRAVENOUS AS NEEDED
Status: DISCONTINUED | OUTPATIENT
Start: 2024-10-13 | End: 2024-10-13

## 2024-10-13 RX ORDER — OXYCODONE HYDROCHLORIDE 5 MG/1
5 TABLET ORAL EVERY 4 HOURS PRN
Status: DISCONTINUED | OUTPATIENT
Start: 2024-10-13 | End: 2024-10-13

## 2024-10-13 RX ORDER — POLYETHYLENE GLYCOL 3350 17 G/17G
17 POWDER, FOR SOLUTION ORAL DAILY PRN
Status: DISCONTINUED | OUTPATIENT
Start: 2024-10-13 | End: 2024-10-14 | Stop reason: HOSPADM

## 2024-10-13 RX ORDER — DIPHENHYDRAMINE HYDROCHLORIDE 50 MG/ML
12.5 INJECTION INTRAMUSCULAR; INTRAVENOUS ONCE AS NEEDED
Status: DISCONTINUED | OUTPATIENT
Start: 2024-10-13 | End: 2024-10-13

## 2024-10-13 RX ORDER — ONDANSETRON HYDROCHLORIDE 2 MG/ML
INJECTION, SOLUTION INTRAVENOUS AS NEEDED
Status: DISCONTINUED | OUTPATIENT
Start: 2024-10-13 | End: 2024-10-13

## 2024-10-13 RX ORDER — METOCLOPRAMIDE HYDROCHLORIDE 5 MG/ML
10 INJECTION INTRAMUSCULAR; INTRAVENOUS ONCE
Status: COMPLETED | OUTPATIENT
Start: 2024-10-13 | End: 2024-10-13

## 2024-10-13 RX ORDER — IBUPROFEN 200 MG
1 TABLET ORAL DAILY
Status: DISCONTINUED | OUTPATIENT
Start: 2024-10-14 | End: 2024-10-14 | Stop reason: HOSPADM

## 2024-10-13 RX ORDER — MIDAZOLAM HYDROCHLORIDE 1 MG/ML
INJECTION, SOLUTION INTRAMUSCULAR; INTRAVENOUS AS NEEDED
Status: DISCONTINUED | OUTPATIENT
Start: 2024-10-13 | End: 2024-10-13

## 2024-10-13 RX ORDER — LABETALOL HYDROCHLORIDE 5 MG/ML
5 INJECTION, SOLUTION INTRAVENOUS ONCE AS NEEDED
Status: DISCONTINUED | OUTPATIENT
Start: 2024-10-13 | End: 2024-10-13

## 2024-10-13 RX ORDER — LIDOCAINE HYDROCHLORIDE 20 MG/ML
INJECTION, SOLUTION EPIDURAL; INFILTRATION; INTRACAUDAL; PERINEURAL AS NEEDED
Status: DISCONTINUED | OUTPATIENT
Start: 2024-10-13 | End: 2024-10-13

## 2024-10-13 RX ORDER — FENTANYL CITRATE 50 UG/ML
INJECTION, SOLUTION INTRAMUSCULAR; INTRAVENOUS AS NEEDED
Status: DISCONTINUED | OUTPATIENT
Start: 2024-10-13 | End: 2024-10-13

## 2024-10-13 RX ADMIN — PANTOPRAZOLE SODIUM 40 MG: 40 TABLET, DELAYED RELEASE ORAL at 21:07

## 2024-10-13 SDOH — SOCIAL STABILITY: SOCIAL INSECURITY: ARE THERE ANY APPARENT SIGNS OF INJURIES/BEHAVIORS THAT COULD BE RELATED TO ABUSE/NEGLECT?: NO

## 2024-10-13 SDOH — HEALTH STABILITY: PHYSICAL HEALTH: ON AVERAGE, HOW MANY DAYS PER WEEK DO YOU ENGAGE IN MODERATE TO STRENUOUS EXERCISE (LIKE A BRISK WALK)?: 3 DAYS

## 2024-10-13 SDOH — SOCIAL STABILITY: SOCIAL INSECURITY
WITHIN THE LAST YEAR, HAVE YOU BEEN KICKED, HIT, SLAPPED, OR OTHERWISE PHYSICALLY HURT BY YOUR PARTNER OR EX-PARTNER?: NO

## 2024-10-13 SDOH — SOCIAL STABILITY: SOCIAL INSECURITY
WITHIN THE LAST YEAR, HAVE TO BEEN RAPED OR FORCED TO HAVE ANY KIND OF SEXUAL ACTIVITY BY YOUR PARTNER OR EX-PARTNER?: NO

## 2024-10-13 SDOH — SOCIAL STABILITY: SOCIAL NETWORK: IN A TYPICAL WEEK, HOW MANY TIMES DO YOU TALK ON THE PHONE WITH FAMILY, FRIENDS, OR NEIGHBORS?: THREE TIMES A WEEK

## 2024-10-13 SDOH — SOCIAL STABILITY: SOCIAL INSECURITY: WITHIN THE LAST YEAR, HAVE YOU BEEN AFRAID OF YOUR PARTNER OR EX-PARTNER?: NO

## 2024-10-13 SDOH — HEALTH STABILITY: MENTAL HEALTH
STRESS IS WHEN SOMEONE FEELS TENSE, NERVOUS, ANXIOUS, OR CAN'T SLEEP AT NIGHT BECAUSE THEIR MIND IS TROUBLED. HOW STRESSED ARE YOU?: NOT AT ALL

## 2024-10-13 SDOH — SOCIAL STABILITY: SOCIAL NETWORK
DO YOU BELONG TO ANY CLUBS OR ORGANIZATIONS SUCH AS CHURCH GROUPS, UNIONS, FRATERNAL OR ATHLETIC GROUPS, OR SCHOOL GROUPS?: NO

## 2024-10-13 SDOH — ECONOMIC STABILITY: INCOME INSECURITY: IN THE PAST 12 MONTHS, HAS THE ELECTRIC, GAS, OIL, OR WATER COMPANY THREATENED TO SHUT OFF SERVICE IN YOUR HOME?: NO

## 2024-10-13 SDOH — ECONOMIC STABILITY: FOOD INSECURITY: WITHIN THE PAST 12 MONTHS, YOU WORRIED THAT YOUR FOOD WOULD RUN OUT BEFORE YOU GOT THE MONEY TO BUY MORE.: NEVER TRUE

## 2024-10-13 SDOH — HEALTH STABILITY: MENTAL HEALTH: CURRENT SMOKER: 1

## 2024-10-13 SDOH — SOCIAL STABILITY: SOCIAL INSECURITY: HAVE YOU HAD THOUGHTS OF HARMING ANYONE ELSE?: NO

## 2024-10-13 SDOH — ECONOMIC STABILITY: INCOME INSECURITY: IN THE LAST 12 MONTHS, WAS THERE A TIME WHEN YOU WERE NOT ABLE TO PAY THE MORTGAGE OR RENT ON TIME?: NO

## 2024-10-13 SDOH — SOCIAL STABILITY: SOCIAL NETWORK: HOW OFTEN DO YOU GET TOGETHER WITH FRIENDS OR RELATIVES?: THREE TIMES A WEEK

## 2024-10-13 SDOH — SOCIAL STABILITY: SOCIAL INSECURITY: DO YOU FEEL ANYONE HAS EXPLOITED OR TAKEN ADVANTAGE OF YOU FINANCIALLY OR OF YOUR PERSONAL PROPERTY?: NO

## 2024-10-13 SDOH — SOCIAL STABILITY: SOCIAL NETWORK: HOW OFTEN DO YOU ATTEND CHURCH OR RELIGIOUS SERVICES?: 1 TO 4 TIMES PER YEAR

## 2024-10-13 SDOH — HEALTH STABILITY: PHYSICAL HEALTH
HOW OFTEN DO YOU NEED TO HAVE SOMEONE HELP YOU WHEN YOU READ INSTRUCTIONS, PAMPHLETS, OR OTHER WRITTEN MATERIAL FROM YOUR DOCTOR OR PHARMACY?: NEVER

## 2024-10-13 SDOH — ECONOMIC STABILITY: HOUSING INSECURITY: AT ANY TIME IN THE PAST 12 MONTHS, WERE YOU HOMELESS OR LIVING IN A SHELTER (INCLUDING NOW)?: NO

## 2024-10-13 SDOH — SOCIAL STABILITY: SOCIAL INSECURITY
WITHIN THE LAST YEAR, HAVE YOU BEEN RAPED OR FORCED TO HAVE ANY KIND OF SEXUAL ACTIVITY BY YOUR PARTNER OR EX-PARTNER?: NO

## 2024-10-13 SDOH — SOCIAL STABILITY: SOCIAL INSECURITY: WITHIN THE LAST YEAR, HAVE YOU BEEN HUMILIATED OR EMOTIONALLY ABUSED IN OTHER WAYS BY YOUR PARTNER OR EX-PARTNER?: NO

## 2024-10-13 SDOH — ECONOMIC STABILITY: HOUSING INSECURITY: IN THE LAST 12 MONTHS, WAS THERE A TIME WHEN YOU WERE NOT ABLE TO PAY THE MORTGAGE OR RENT ON TIME?: NO

## 2024-10-13 SDOH — ECONOMIC STABILITY: FOOD INSECURITY: WITHIN THE PAST 12 MONTHS, THE FOOD YOU BOUGHT JUST DIDN'T LAST AND YOU DIDN'T HAVE MONEY TO GET MORE.: NEVER TRUE

## 2024-10-13 SDOH — ECONOMIC STABILITY: FOOD INSECURITY: WITHIN THE PAST 12 MONTHS, YOU WORRIED THAT YOUR FOOD WOULD RUN OUT BEFORE YOU GOT MONEY TO BUY MORE.: NEVER TRUE

## 2024-10-13 SDOH — HEALTH STABILITY: PHYSICAL HEALTH: ON AVERAGE, HOW MANY MINUTES DO YOU ENGAGE IN EXERCISE AT THIS LEVEL?: 30 MIN

## 2024-10-13 SDOH — ECONOMIC STABILITY: TRANSPORTATION INSECURITY: IN THE PAST 12 MONTHS, HAS LACK OF TRANSPORTATION KEPT YOU FROM MEDICAL APPOINTMENTS OR FROM GETTING MEDICATIONS?: NO

## 2024-10-13 SDOH — HEALTH STABILITY: MENTAL HEALTH
DO YOU FEEL STRESS - TENSE, RESTLESS, NERVOUS, OR ANXIOUS, OR UNABLE TO SLEEP AT NIGHT BECAUSE YOUR MIND IS TROUBLED ALL THE TIME - THESE DAYS?: NOT AT ALL

## 2024-10-13 SDOH — ECONOMIC STABILITY: INCOME INSECURITY: IN THE PAST 12 MONTHS HAS THE ELECTRIC, GAS, OIL, OR WATER COMPANY THREATENED TO SHUT OFF SERVICES IN YOUR HOME?: NO

## 2024-10-13 SDOH — ECONOMIC STABILITY: HOUSING INSECURITY: IN THE PAST 12 MONTHS, HOW MANY TIMES HAVE YOU MOVED WHERE YOU WERE LIVING?: 0

## 2024-10-13 SDOH — SOCIAL STABILITY: SOCIAL NETWORK
DO YOU BELONG TO ANY CLUBS OR ORGANIZATIONS SUCH AS CHURCH GROUPS UNIONS, FRATERNAL OR ATHLETIC GROUPS, OR SCHOOL GROUPS?: NO

## 2024-10-13 SDOH — SOCIAL STABILITY: SOCIAL INSECURITY: DO YOU FEEL UNSAFE GOING BACK TO THE PLACE WHERE YOU ARE LIVING?: NO

## 2024-10-13 SDOH — ECONOMIC STABILITY: TRANSPORTATION INSECURITY
IN THE PAST 12 MONTHS, HAS LACK OF TRANSPORTATION KEPT YOU FROM MEETINGS, WORK, OR FROM GETTING THINGS NEEDED FOR DAILY LIVING?: NO

## 2024-10-13 SDOH — ECONOMIC STABILITY: INCOME INSECURITY: HOW HARD IS IT FOR YOU TO PAY FOR THE VERY BASICS LIKE FOOD, HOUSING, MEDICAL CARE, AND HEATING?: NOT VERY HARD

## 2024-10-13 SDOH — ECONOMIC STABILITY: FOOD INSECURITY: HOW HARD IS IT FOR YOU TO PAY FOR THE VERY BASICS LIKE FOOD, HOUSING, MEDICAL CARE, AND HEATING?: NOT VERY HARD

## 2024-10-13 SDOH — ECONOMIC STABILITY: TRANSPORTATION INSECURITY
IN THE PAST 12 MONTHS, HAS THE LACK OF TRANSPORTATION KEPT YOU FROM MEDICAL APPOINTMENTS OR FROM GETTING MEDICATIONS?: NO

## 2024-10-13 SDOH — SOCIAL STABILITY: SOCIAL INSECURITY: HAVE YOU HAD ANY THOUGHTS OF HARMING ANYONE ELSE?: NO

## 2024-10-13 SDOH — SOCIAL STABILITY: SOCIAL INSECURITY: ABUSE: ADULT

## 2024-10-13 SDOH — SOCIAL STABILITY: SOCIAL INSECURITY: ARE YOU OR HAVE YOU BEEN THREATENED OR ABUSED PHYSICALLY, EMOTIONALLY, OR SEXUALLY BY ANYONE?: NO

## 2024-10-13 SDOH — SOCIAL STABILITY: SOCIAL INSECURITY: HAS ANYONE EVER THREATENED TO HURT YOUR FAMILY OR YOUR PETS?: NO

## 2024-10-13 SDOH — SOCIAL STABILITY: SOCIAL INSECURITY: DOES ANYONE TRY TO KEEP YOU FROM HAVING/CONTACTING OTHER FRIENDS OR DOING THINGS OUTSIDE YOUR HOME?: NO

## 2024-10-13 ASSESSMENT — PAIN SCALES - GENERAL
PAINLEVEL_OUTOF10: 0 - NO PAIN

## 2024-10-13 ASSESSMENT — COGNITIVE AND FUNCTIONAL STATUS - GENERAL
DAILY ACTIVITIY SCORE: 24
PATIENT BASELINE BEDBOUND: NO
MOBILITY SCORE: 24

## 2024-10-13 ASSESSMENT — COLUMBIA-SUICIDE SEVERITY RATING SCALE - C-SSRS
1. IN THE PAST MONTH, HAVE YOU WISHED YOU WERE DEAD OR WISHED YOU COULD GO TO SLEEP AND NOT WAKE UP?: NO
6. HAVE YOU EVER DONE ANYTHING, STARTED TO DO ANYTHING, OR PREPARED TO DO ANYTHING TO END YOUR LIFE?: NO
2. HAVE YOU ACTUALLY HAD ANY THOUGHTS OF KILLING YOURSELF?: NO
2. HAVE YOU ACTUALLY HAD ANY THOUGHTS OF KILLING YOURSELF?: NO
1. IN THE PAST MONTH, HAVE YOU WISHED YOU WERE DEAD OR WISHED YOU COULD GO TO SLEEP AND NOT WAKE UP?: NO
6. HAVE YOU EVER DONE ANYTHING, STARTED TO DO ANYTHING, OR PREPARED TO DO ANYTHING TO END YOUR LIFE?: NO

## 2024-10-13 ASSESSMENT — ACTIVITIES OF DAILY LIVING (ADL)
LACK_OF_TRANSPORTATION: NO
ADEQUATE_TO_COMPLETE_ADL: YES
TOILETING: INDEPENDENT
JUDGMENT_ADEQUATE_SAFELY_COMPLETE_DAILY_ACTIVITIES: YES
GROOMING: INDEPENDENT
BATHING: INDEPENDENT
HEARING - RIGHT EAR: FUNCTIONAL
LACK_OF_TRANSPORTATION: NO
WALKS IN HOME: INDEPENDENT
FEEDING YOURSELF: INDEPENDENT
HEARING - LEFT EAR: FUNCTIONAL
PATIENT'S MEMORY ADEQUATE TO SAFELY COMPLETE DAILY ACTIVITIES?: YES
LACK_OF_TRANSPORTATION: NO
DRESSING YOURSELF: INDEPENDENT

## 2024-10-13 ASSESSMENT — ENCOUNTER SYMPTOMS
GASTROINTESTINAL NEGATIVE: 1
PSYCHIATRIC NEGATIVE: 1
TROUBLE SWALLOWING: 1
HEMATOLOGIC/LYMPHATIC NEGATIVE: 1
RESPIRATORY NEGATIVE: 1
EYES NEGATIVE: 1
ALLERGIC/IMMUNOLOGIC NEGATIVE: 1
ENDOCRINE NEGATIVE: 1
MUSCULOSKELETAL NEGATIVE: 1
NEUROLOGICAL NEGATIVE: 1
CONSTITUTIONAL NEGATIVE: 1
CARDIOVASCULAR NEGATIVE: 1

## 2024-10-13 ASSESSMENT — PAIN - FUNCTIONAL ASSESSMENT
PAIN_FUNCTIONAL_ASSESSMENT: 0-10

## 2024-10-13 ASSESSMENT — LIFESTYLE VARIABLES
PRESCIPTION_ABUSE_PAST_12_MONTHS: NO
AUDIT-C TOTAL SCORE: 0
HOW OFTEN DO YOU HAVE 6 OR MORE DRINKS ON ONE OCCASION: NEVER
AUDIT-C TOTAL SCORE: 0
SKIP TO QUESTIONS 9-10: 1
SUBSTANCE_ABUSE_PAST_12_MONTHS: NO
HOW MANY STANDARD DRINKS CONTAINING ALCOHOL DO YOU HAVE ON A TYPICAL DAY: PATIENT DOES NOT DRINK
HOW OFTEN DO YOU HAVE A DRINK CONTAINING ALCOHOL: NEVER

## 2024-10-13 ASSESSMENT — VISUAL ACUITY: OU: 1

## 2024-10-13 ASSESSMENT — PATIENT HEALTH QUESTIONNAIRE - PHQ9
2. FEELING DOWN, DEPRESSED OR HOPELESS: NOT AT ALL
1. LITTLE INTEREST OR PLEASURE IN DOING THINGS: NOT AT ALL
SUM OF ALL RESPONSES TO PHQ9 QUESTIONS 1 & 2: 0

## 2024-10-13 NOTE — ANESTHESIA PREPROCEDURE EVALUATION
Patient: Markie Mukherjee    Procedure Information       Anesthesia Start Date/Time: 10/13/24 1909    Procedure: EGD    Location: [facility]            Relevant Problems   No relevant active problems       Clinical information reviewed:    Allergies                NPO Detail:  No data recorded     Physical Exam    Airway  Mallampati: III  TM distance: >3 FB  Neck ROM: full     Cardiovascular    Dental   (+) lower dentures, upper dentures     Pulmonary    Abdominal            Anesthesia Plan    History of general anesthesia?: yes  History of complications of general anesthesia?: no    ASA 2 - emergent     general     The patient is a current smoker.  Patient was previously instructed to abstain from smoking on day of procedure.  Patient did not smoke on day of procedure.    intravenous induction   Anesthetic plan and risks discussed with patient.    Plan discussed with CAA.

## 2024-10-13 NOTE — ANESTHESIA POSTPROCEDURE EVALUATION
Patient: Markie Mukherjee    Procedure Summary       Date: 10/13/24 Room / Location: [facility]    Anesthesia Start: 1909 Anesthesia Stop: 1951    Procedure: EGD Diagnosis: Food impaction of esophagus, initial encounter    Scheduled Providers:  Responsible Provider: Gerhard Alvarez MD    Anesthesia Type: general ASA Status: 2 - Emergent            Anesthesia Type: No value filed.    Vitals Value Taken Time   /81 10/13/24 1952   Temp 36.9 °C (98.4 °F) 10/13/24 1952   Pulse 81 10/13/24 1952   Resp 19 10/13/24 1952   SpO2 97 % 10/13/24 1952   Vitals shown include unfiled device data.    Anesthesia Post Evaluation    Patient location during evaluation: bedside  Patient participation: complete - patient participated  Level of consciousness: awake  Pain management: adequate  Multimodal analgesia pain management approach  Airway patency: patent  Cardiovascular status: stable  Respiratory status: spontaneous ventilation and unassisted  Hydration status: acceptable  Postoperative Nausea and Vomiting: none  Comments: No significant PONV.        No notable events documented.

## 2024-10-13 NOTE — ED PROVIDER NOTES
History/Exam limitations: none.   Additional history was obtained from patient.          HPI:    Markie Mukherjee is a 29 y.o. male Presenting for evaluation of suspected esophageal food impaction.  Ate a hot dog yesterday evening and has not been to swallow liquids or solids since.  Was seen at outside hospital and trialed Reglan/glucagon without improvement.  Patient states he is managing his secretions well but still feels he is unable to swallow liquids or solids.  Was excepted by GI for transfer here to [facility] for intervention.          Physical Exam:  ED Triage Vitals [10/13/24 1551]   Temperature Heart Rate Respirations BP   36.4 °C (97.6 °F) 71 16 116/76      Pulse Ox Temp Source Heart Rate Source Patient Position   97 % Oral Monitor --      BP Location FiO2 (%)     -- --        GEN:      Alert, NAD  Eyes:       PERRL, EOMI  HENT:      NC/AT, OP clear, airway patent, MM, managing secretions  CV:      RRR, no MRG, 2+ radial pulses bilaterally  PULM:     CTAB, no w/r/r, easy WOB, symmetric chest rise  ABD:      Soft, NT, ND, no masses, BS +  NEURO:   A&Ox3  MSK:      FROM, no joint deformities or swelling, no e/o trauma  SKIN:       Warm and dry  PSYCH:    Appropriate mood and affect         MDM/ED Course:   Markie Mukherjee is a 29 y.o. male Presenting for evaluation of suspected esophageal food impaction.  Vitals reassuring.  Exam as documented above.  Prior to patient arrival Dr. Hennessy from GI notified me that he was being transferred here and Dr. Hennessy notified on patient arrival patient has arrived so endoscopy team can be brought in.  Considered ACS or other cause of discomfort however less likely given symptoms started after ingestion of hot dog and difficulty tolerating liquids and solids.  Outside hospital trialed Zofran/glucagon without resolution.  Per GI request x-ray neck and chest obtained to assess for signs of perforation or foreign body and x-rays are negative.  Patient was taken to endoscopy suite for  further evaluation in stable condition.  Following endoscopy as messaged by Dr. Hennessy who requested patient gets admitted for observation due to and esophageal inflammation.  Patient no longer in the ED however I was able to reach the observation team who agreed to hospitalize patient for continued monitoring.     ED Course as of 10/16/24 1531   Sun Oct 13, 2024   1630 GI requests x-ray chest and neck [JM]      ED Course User Index  [JM] Aaron Cortez MD         Diagnoses as of 10/16/24 1531   Esophageal foreign body, initial encounter         Chronic medical conditions affecting care listed in MDM. I independently reviewed imaging studies and agreed with radiology reads. I reviewed recent and relevant outside records including PCP notes, Prior discharge summaries, and prior radiology reports.    Procedure  Procedures    Diagnosis:   1.  Esophageal foreign body    Dispo: Endoscopy suite in stable condition      Disclaimer: Portions of this note were dictated by speech recognition. An attempt at proof reading was made to minimize errors. Minor errors in transcription may be present.  Please call if questions.     Aaron Cortez MD  10/16/24 1531

## 2024-10-13 NOTE — ANESTHESIA PROCEDURE NOTES
Airway  Date/Time: 10/13/2024 7:14 PM  Urgency: elective    Airway not difficult    Staffing  Performed: XANDER   Authorized by: Gerhard Alvarez MD    Performed by: XANDER Cole  Patient location during procedure: OR    Indications and Patient Condition  Indications for airway management: anesthesia  Spontaneous Ventilation: absent  Sedation level: deep  Preoxygenated: yes  Patient position: sniffing  Mask difficulty assessment: 0 - not attempted    Final Airway Details  Final airway type: endotracheal airway      Successful airway: ETT  Cuffed: yes   Successful intubation technique: direct laryngoscopy  Endotracheal tube insertion site: oral  Blade: Alan  Blade size: #4  ETT size (mm): 7.5  Cormack-Lehane Classification: grade I - full view of glottis  Placement verified by: capnometry   Measured from: lips  ETT to lips (cm): 22  Number of attempts at approach: 1

## 2024-10-13 NOTE — ED PROVIDER NOTES
HPI   Chief Complaint   Patient presents with    Foreign Body       29-year-old male with history of esophageal foreign body presents with food stuck in his throat.  States he ate a hot dog last night and it is stuck in his throat.  Subsequently has been unable to swallow liquids or solids.  Complaining of pain in his upper chest.  States every time he tries to drink liquids or eat food it comes back up and he vomits.  Prior history of esophageal food bolus impaction in August of this year requiring EGD and removal of food bolus.  States symptoms feel the same as they did this past August.      History provided by:  Patient and medical records          Patient History   No past medical history on file.  Past Surgical History:   Procedure Laterality Date    OTHER SURGICAL HISTORY  02/16/2021    Knee surgery     No family history on file.  Social History     Tobacco Use    Smoking status: Never    Smokeless tobacco: Never   Vaping Use    Vaping status: Every Day   Substance Use Topics    Alcohol use: Not on file    Drug use: Not on file       Physical Exam   ED Triage Vitals [10/13/24 1201]   Temperature Heart Rate Respirations BP   36.4 °C (97.5 °F) 83 18 121/75      Pulse Ox Temp Source Heart Rate Source Patient Position   97 % Temporal Monitor --      BP Location FiO2 (%)     -- --       Physical Exam  Vitals and nursing note reviewed.   Constitutional:       General: He is not in acute distress.     Appearance: He is well-developed.   HENT:      Head: Normocephalic and atraumatic.      Mouth/Throat:      Comments: Patient has an emesis basin.  He is spitting out his saliva.  He has a normal voice.  His oropharynx is clear.  He is protecting his airway.  Eyes:      Conjunctiva/sclera: Conjunctivae normal.   Cardiovascular:      Rate and Rhythm: Normal rate and regular rhythm.      Heart sounds: No murmur heard.  Pulmonary:      Effort: Pulmonary effort is normal. No respiratory distress.      Breath sounds: Normal  breath sounds.   Abdominal:      Palpations: Abdomen is soft.      Tenderness: There is no abdominal tenderness.   Musculoskeletal:         General: No swelling.      Cervical back: Neck supple.   Skin:     General: Skin is warm and dry.      Capillary Refill: Capillary refill takes less than 2 seconds.   Neurological:      Mental Status: He is alert and oriented to person, place, and time. Mental status is at baseline.      Cranial Nerves: No cranial nerve deficit.      Sensory: No sensory deficit.      Motor: No weakness.   Psychiatric:         Mood and Affect: Mood normal.           ED Course & MDM   ED Course as of 10/13/24 1429   Sun Oct 13, 2024   1335 29-year-old male with esophageal food bolus impaction.  Glucagon, Reglan.  Labs.  Will transfer to facility with GI availability to remove the impacted esophageal food bolus. [BT]   1424 Spoke with [facility] transfer [place].  They spoke with GI Dr. Hennessy at [facility] who is ED to ED transfer with plans for EGD.  Spoke with ED staff at [facility] Dr. Stephenson who accepted patient for transfer.  Patient transported via private vehicle.  Airway remained patent.  He is handling his secretions although he is spitting at times into an emesis bag. [BT]   1428 Laboratory studies unremarkable. [BT]      ED Course User Index  [BT] Hawk Huang DO         Diagnoses as of 10/13/24 1429   Food impaction of esophagus, initial encounter   Dysphagia, unspecified type                 No data recorded     [place] Coma Scale Score: 15 (10/13/24 1326 : Fide Patton RN)                           Medical Decision Making      Procedure  Procedures     Hawk Huang DO  10/13/24 1430

## 2024-10-13 NOTE — ED TRIAGE NOTES
Pt arrives to ED from [facility] for foreign body stuck in his throat. Pt states he was eating hot dog yesterday and states it got stuck in his throat. Pt reports it has been stuck there ever since. Pt denies SOB or respiratory distress. Pt reports having this happen 2 months ago with chicken.

## 2024-10-13 NOTE — ED TRIAGE NOTES
Pt arrives ambulatory to [facility], presenting with a foreign body stuck in his throat, patient states he was eating a beef hot dog yesterday afternoon and has felt it in his throat ever since. Patient's SPO2 appropriate on monitor, pt denying any respiratory distress or SOB. Patient states this also happened 2 months ago where he was referred to have a scope done. Patient denies any CP, N/V/D, fever and/or chills. Pt A&Ox4, resp easy and unlabored, skin of appropriate color.

## 2024-10-13 NOTE — CONSULTS
"Gastroenterology Consultation Note    ASSESSMENT and PLAN:     Markie Mukherjee is a 29 y.o. male with h/o food impaction, GERD, who presents with food impaction    - urgent EGD today    GI consulted for food impaction     Carlita Hennessy MD, MS  10/13/2024      HISTORY OF PRESENT ILLNESS:     History Of Present Illness:    Markie Mukherjee is a 29 y.o. male with h/o food impaction, GERD, who presents with food impaction    Pt states he ate a hot dog last night and it is stuck in his throat. Subsequently has been unable to swallow liquids or solids. Complaining of pain in his upper chest. States every time he tries to drink liquids or eat food it comes back up and he vomits.      8/2024 - EGD - food bolus removed from esophagus, mucosa s/o EoE but path was negative for EoE    He was started on PPIs but he was not taking it.    Relevant endoscopic evaluation results were personally reviewed.    Review of systems:   Review of Systems   A 10+ point ROS was completed and otherwise negative except as noted and per HPI.    PAST HISTORIES:     Past Medical History:  No past medical history on file.    Past Surgical History:  Past Surgical History:   Procedure Laterality Date    OTHER SURGICAL HISTORY  02/16/2021    Knee surgery        Family History:  No family history on file.     Social History:   reports that he has never smoked. He has never used smokeless tobacco. No history on file for alcohol use and drug use.    OBJECTIVE:     Last Recorded Vitals:  /76   Pulse 71   Temp 36.4 °C (97.6 °F) (Oral)   Resp 16   Ht 1.778 m (5' 10\")   Wt 95.3 kg (210 lb)   SpO2 97%   BMI 30.13 kg/m²   Physical Exam:  Abdomen: soft, non tender, no guarding/rigidity      Allergies:  No Known Allergies    Inpatient Medications:          Outpatient Medications:  Prior to Admission medications    Medication Sig Start Date End Date Taking? Authorizing Provider   nicotine (Nicoderm CQ) 14 mg/24 hr patch Place 1 patch over 24 hours on the skin " once daily. 8/15/24 9/14/24  Thea Whittaker DO   pantoprazole (ProtoNix) 40 mg EC tablet Take 1 tablet (40 mg) by mouth once daily in the morning. Take before meals. Do not crush, chew, or split. 8/14/24 9/13/24  Thea Whittaker DO   polyethylene glycol (Miralax) 17 gram/dose powder Take 17 g by mouth 2 times a day. 12/28/23   Awa Sultana DO       LABS AND IMAGING:     Labs:  Results for orders placed or performed during the hospital encounter of 10/13/24 (from the past 24 hour(s))   Comprehensive metabolic panel   Result Value Ref Range    Glucose 84 74 - 99 mg/dL    Sodium 136 136 - 145 mmol/L    Potassium 4.1 3.5 - 5.3 mmol/L    Chloride 101 98 - 107 mmol/L    Bicarbonate 28 21 - 32 mmol/L    Anion Gap 11 10 - 20 mmol/L    Urea Nitrogen 13 6 - 23 mg/dL    Creatinine 1.09 0.50 - 1.30 mg/dL    eGFR >90 >60 mL/min/1.73m*2    Calcium 9.8 8.6 - 10.3 mg/dL    Albumin 4.8 3.4 - 5.0 g/dL    Alkaline Phosphatase 68 33 - 120 U/L    Total Protein 7.7 6.4 - 8.2 g/dL    AST 31 9 - 39 U/L    Bilirubin, Total 1.7 (H) 0.0 - 1.2 mg/dL    ALT 63 (H) 10 - 52 U/L   CBC and Auto Differential   Result Value Ref Range    WBC 7.9 4.4 - 11.3 x10*3/uL    nRBC 0.0 0.0 - 0.0 /100 WBCs    RBC 5.55 4.50 - 5.90 x10*6/uL    Hemoglobin 17.6 (H) 13.5 - 17.5 g/dL    Hematocrit 50.5 41.0 - 52.0 %    MCV 91 80 - 100 fL    MCH 31.7 26.0 - 34.0 pg    MCHC 34.9 32.0 - 36.0 g/dL    RDW 11.9 11.5 - 14.5 %    Platelets 256 150 - 450 x10*3/uL    Neutrophils % 65.8 40.0 - 80.0 %    Immature Granulocytes %, Automated 0.3 0.0 - 0.9 %    Lymphocytes % 24.4 13.0 - 44.0 %    Monocytes % 6.3 2.0 - 10.0 %    Eosinophils % 2.4 0.0 - 6.0 %    Basophils % 0.8 0.0 - 2.0 %    Neutrophils Absolute 5.23 1.20 - 7.70 x10*3/uL    Immature Granulocytes Absolute, Automated 0.02 0.00 - 0.70 x10*3/uL    Lymphocytes Absolute 1.94 1.20 - 4.80 x10*3/uL    Monocytes Absolute 0.50 0.10 - 1.00 x10*3/uL    Eosinophils Absolute 0.19 0.00 - 0.70 x10*3/uL    Basophils  Absolute 0.06 0.00 - 0.10 x10*3/uL   aPTT   Result Value Ref Range    aPTT 34 27 - 38 seconds   Protime-INR   Result Value Ref Range    Protime 12.0 9.8 - 12.8 seconds    INR 1.1 0.9 - 1.1        Relevant imaging results were personally reviewed.

## 2024-10-14 ENCOUNTER — APPOINTMENT (OUTPATIENT)
Dept: CARDIOLOGY | Facility: HOSPITAL | Age: 29
End: 2024-10-14
Payer: COMMERCIAL

## 2024-10-14 VITALS
TEMPERATURE: 96.5 F | SYSTOLIC BLOOD PRESSURE: 120 MMHG | BODY MASS INDEX: 30.06 KG/M2 | HEIGHT: 70 IN | DIASTOLIC BLOOD PRESSURE: 70 MMHG | RESPIRATION RATE: 16 BRPM | HEART RATE: 58 BPM | OXYGEN SATURATION: 95 % | WEIGHT: 210 LBS

## 2024-10-14 VITALS
HEART RATE: 82 BPM | BODY MASS INDEX: 30.06 KG/M2 | DIASTOLIC BLOOD PRESSURE: 70 MMHG | SYSTOLIC BLOOD PRESSURE: 111 MMHG | TEMPERATURE: 97.5 F | WEIGHT: 210 LBS | RESPIRATION RATE: 18 BRPM | OXYGEN SATURATION: 94 % | HEIGHT: 70 IN

## 2024-10-14 PROCEDURE — 93005 ELECTROCARDIOGRAM TRACING: CPT

## 2024-10-14 PROCEDURE — G0378 HOSPITAL OBSERVATION PER HR: HCPCS

## 2024-10-14 PROCEDURE — 2500000001 HC RX 250 WO HCPCS SELF ADMINISTERED DRUGS (ALT 637 FOR MEDICARE OP): Performed by: INTERNAL MEDICINE

## 2024-10-14 PROCEDURE — 99238 HOSP IP/OBS DSCHRG MGMT 30/<: CPT

## 2024-10-14 RX ORDER — PANTOPRAZOLE SODIUM 40 MG/1
40 TABLET, DELAYED RELEASE ORAL
Qty: 60 TABLET | Refills: 0 | Status: SHIPPED | OUTPATIENT
Start: 2024-10-14

## 2024-10-14 RX ADMIN — PANTOPRAZOLE SODIUM 40 MG: 40 TABLET, DELAYED RELEASE ORAL at 06:52

## 2024-10-14 ASSESSMENT — PAIN - FUNCTIONAL ASSESSMENT
PAIN_FUNCTIONAL_ASSESSMENT: 0-10

## 2024-10-14 ASSESSMENT — PAIN SCALES - GENERAL
PAINLEVEL_OUTOF10: 0 - NO PAIN

## 2024-10-14 ASSESSMENT — ACTIVITIES OF DAILY LIVING (ADL): LACK_OF_TRANSPORTATION: NO

## 2024-10-14 NOTE — PROGRESS NOTES
10/14/24 0806   [facility] Disability Status   Are you deaf or do you have serious difficulty hearing? N   Are you blind or do you have serious difficulty seeing, even when wearing glasses? N   Because of a physical, mental, or emotional condition, do you have serious difficulty concentrating, remembering, or making decisions? (5 years old or older) N   Do you have serious difficulty walking or climbing stairs? N   Do you have serious difficulty dressing or bathing? N   Because of a physical, mental, or emotional condition, do you have serious difficulty doing errands alone such as visiting the doctor? N

## 2024-10-14 NOTE — CARE PLAN
The patient's goals for the shift include Patient will not have any difficulty swallowing all shift    The clinical goals for the shift include Patient will not have any difficulty swallowing all shift    Over the shift, the patient did not make progress toward the following goals. Barriers to progression include orientation to new unit. Recommendations to address these barriers include promote mobility.

## 2024-10-14 NOTE — HOSPITAL COURSE
Markie Mukherjee is a 29 y.o. male with a past medical history of food impaction (last occurrence 8/2024), and GERD who presents to [facility] ED for complaints of food impaction. Transferred to [facility] for GI consultation/ EGD. Patient had eaten a hot dog that subsequently got stuck in his throat.  He was unable to swallow and liquids. Denied any shortness of breath, difficulty breathing. Additionally started to have some associated epigastric pain. Promptly came to ED for evaluation.       He underwent

## 2024-10-14 NOTE — CARE PLAN
The patient's goals for the shift include Patient will not have any difficulty swallowing all shift    The clinical goals for the shift include Patient will not have any difficulty swallowing all shift      Problem: Pain - Adult  Goal: Verbalizes/displays adequate comfort level or baseline comfort level  10/13/2024 2306 by Herminia Calixto RN  Outcome: Progressing  10/13/2024 2305 by Herminia Calixto, RN  Outcome: Not Progressing

## 2024-10-14 NOTE — PROGRESS NOTES
Transitional Care Coordination Progress Note:  Plan per Medical/Surgical team: treatment of Food impaction, POD #1 of EGD with protonix & GI consult following   Status: Observation  Payor source: [employer] medicaid  Discharge disposition: Home alone   Potential Barriers: (hot dog stuck in throat), needs to tolerate advanced diet today  ADOD: 10/14/2024   CLARITZA Aden RN, BSN Transitional Care Coordinator ED# [phone]      10/14/24 0806   Discharge Planning   Living Arrangements Alone   Support Systems Spouse/significant other;Parent   Assistance Needed Gi clearance for DC today   Type of Residence Private residence   Number of Stairs to Enter Residence 4   Number of Stairs Within Residence 24   Do you have animals or pets at home? Yes   Type of Animals or Pets Fish   Home or Post Acute Services None   Expected Discharge Disposition Home   Does the patient need discharge transport arranged? No   Financial Resource Strain   How hard is it for you to pay for the very basics like food, housing, medical care, and heating? Not hard   Housing Stability   In the last 12 months, was there a time when you were not able to pay the mortgage or rent on time? N   In the past 12 months, how many times have you moved where you were living? 1   At any time in the past 12 months, were you homeless or living in a shelter (including now)? N   Transportation Needs   In the past 12 months, has lack of transportation kept you from medical appointments or from getting medications? no   In the past 12 months, has lack of transportation kept you from meetings, work, or from getting things needed for daily living? No

## 2024-10-14 NOTE — DISCHARGE INSTRUCTIONS
[facility] Discharge Instructions  You came to the hospital with food impaction and were admitted for observation and further care.   You were treated with EGD procedure.   A gastroenterologist specialist saw you while admitted and helped manage your care.   Your discharge plan is to go home to recover.    Please see your primary care doctor in 1 week for follow-up.   An appointment has been requested for you but may you need to call your doctors office to schedule.    Additionally, a referral has been ordered for you to follow up with GI.   The office or scheduling department should call you to arrange an appointment in the next week or two.     For any issues or concerns with appointments, call the  scheduling line at [phone] or the providers office directly.    Please take the omeprazole for 12 weeks twice a day, then after that daily.  A referral has been placed to follow up with GI. A repeat EGD should be performed in one month.     See the attached information for education about any new medications and the condition(s) you were treated for.  Your medications may have changed so pay close attention to the list given to you at discharge and ask any questions you have before leaving the hospital.

## 2024-10-14 NOTE — CARE PLAN
Problem: Pain - Adult  Goal: Verbalizes/displays adequate comfort level or baseline comfort level  Outcome: Not Progressing   The patient's goals for the shift include Patient will not have any difficulty swallowing all shift    The clinical goals for the shift include Patient will not have any difficulty swallowing all shift      Problem: Pain - Adult  Goal: Verbalizes/displays adequate comfort level or baseline comfort level  Outcome: Not Progressing

## 2024-10-14 NOTE — H&P
History Of Present Illness  Markie Mukherjee is a 29 y.o. male with a past medical history of food impaction (last occurrence 8/2024), and GERD who presents to [facility] ED for complaints of food impaction. Transferred to [facility] for GI consultation/ EGD. Patient had eaten a hot dog that subsequently got stuck in his throat.  He was unable to swallow and liquids. Denied any shortness of breath, difficulty breathing. Additionally started to have some associated epigastric pain. Promptly came to ED for evaluation.     ED work up:  XR Neck: No acute findings chest or neck soft tissues. No evidence of   radiopaque foreign body.   CXR: No acute findings chest or neck soft tissues. No evidence of   radiopaque foreign body.     Past Medical History  He has no past medical history on file.    Surgical History  He has a past surgical history that includes Other surgical history (02/16/2021).     Social History  He reports that he has never smoked. He has never used smokeless tobacco. No history on file for alcohol use and drug use.    Family History  No family history on file.     Allergies  Patient has no known allergies.    Review of Systems   Constitutional: Negative.    HENT:  Positive for trouble swallowing.    Eyes: Negative.    Respiratory: Negative.     Cardiovascular: Negative.    Gastrointestinal: Negative.    Endocrine: Negative.    Genitourinary: Negative.    Musculoskeletal: Negative.    Skin: Negative.    Allergic/Immunologic: Negative.    Neurological: Negative.    Hematological: Negative.    Psychiatric/Behavioral: Negative.     All other systems reviewed and are negative.       Physical Exam  Vitals and nursing note reviewed.   Constitutional:       Appearance: Normal appearance.   HENT:      Head: Normocephalic.      Nose: Nose normal.      Mouth/Throat:      Lips: Pink.      Mouth: Mucous membranes are moist.      Pharynx: Oropharynx is clear.   Eyes:      General: Lids are normal. Lids are  everted, no foreign bodies appreciated. Vision grossly intact. Gaze aligned appropriately.      Extraocular Movements: Extraocular movements intact.      Conjunctiva/sclera: Conjunctivae normal.   Neck:      Trachea: Trachea normal.   Cardiovascular:      Rate and Rhythm: Normal rate.      Pulses: Normal pulses.      Heart sounds: Normal heart sounds.   Pulmonary:      Effort: Pulmonary effort is normal.      Breath sounds: Normal breath sounds.   Abdominal:      General: Abdomen is flat. Bowel sounds are normal.      Palpations: Abdomen is soft.   Musculoskeletal:         General: Normal range of motion.      Cervical back: Full passive range of motion without pain.   Feet:      Right foot:      Skin integrity: Skin integrity normal.      Left foot:      Skin integrity: Skin integrity normal.   Skin:     General: Skin is warm and dry.      Capillary Refill: Capillary refill takes less than 2 seconds.   Neurological:      General: No focal deficit present.      Mental Status: He is alert and oriented to person, place, and time. Mental status is at baseline.   Psychiatric:         Attention and Perception: Attention and perception normal.         Mood and Affect: Mood and affect normal.         Speech: Speech normal.         Behavior: Behavior normal. Behavior is cooperative.         Thought Content: Thought content normal.         Cognition and Memory: Cognition normal.         Judgment: Judgment normal.          Last Recorded Vitals  /72   Pulse 78   Temp 36.1 °C (96.9 °F)   Resp 16   Wt 95.3 kg (210 lb)   SpO2 92%     Relevant Results  Scheduled medications  lidocaine, 0.1 mL, subcutaneous, Once  pantoprazole, 40 mg, oral, BID AC      Continuous medications     PRN medications  PRN medications: diphenhydrAMINE, HYDROmorphone, HYDROmorphone, labetaloL, ondansetron, oxyCODONE, oxygen, promethazine  Results for orders placed or performed during the hospital encounter of 10/13/24 (from the past 24 hour(s))    Comprehensive metabolic panel   Result Value Ref Range    Glucose 84 74 - 99 mg/dL    Sodium 136 136 - 145 mmol/L    Potassium 4.1 3.5 - 5.3 mmol/L    Chloride 101 98 - 107 mmol/L    Bicarbonate 28 21 - 32 mmol/L    Anion Gap 11 10 - 20 mmol/L    Urea Nitrogen 13 6 - 23 mg/dL    Creatinine 1.09 0.50 - 1.30 mg/dL    eGFR >90 >60 mL/min/1.73m*2    Calcium 9.8 8.6 - 10.3 mg/dL    Albumin 4.8 3.4 - 5.0 g/dL    Alkaline Phosphatase 68 33 - 120 U/L    Total Protein 7.7 6.4 - 8.2 g/dL    AST 31 9 - 39 U/L    Bilirubin, Total 1.7 (H) 0.0 - 1.2 mg/dL    ALT 63 (H) 10 - 52 U/L   CBC and Auto Differential   Result Value Ref Range    WBC 7.9 4.4 - 11.3 x10*3/uL    nRBC 0.0 0.0 - 0.0 /100 WBCs    RBC 5.55 4.50 - 5.90 x10*6/uL    Hemoglobin 17.6 (H) 13.5 - 17.5 g/dL    Hematocrit 50.5 41.0 - 52.0 %    MCV 91 80 - 100 fL    MCH 31.7 26.0 - 34.0 pg    MCHC 34.9 32.0 - 36.0 g/dL    RDW 11.9 11.5 - 14.5 %    Platelets 256 150 - 450 x10*3/uL    Neutrophils % 65.8 40.0 - 80.0 %    Immature Granulocytes %, Automated 0.3 0.0 - 0.9 %    Lymphocytes % 24.4 13.0 - 44.0 %    Monocytes % 6.3 2.0 - 10.0 %    Eosinophils % 2.4 0.0 - 6.0 %    Basophils % 0.8 0.0 - 2.0 %    Neutrophils Absolute 5.23 1.20 - 7.70 x10*3/uL    Immature Granulocytes Absolute, Automated 0.02 0.00 - 0.70 x10*3/uL    Lymphocytes Absolute 1.94 1.20 - 4.80 x10*3/uL    Monocytes Absolute 0.50 0.10 - 1.00 x10*3/uL    Eosinophils Absolute 0.19 0.00 - 0.70 x10*3/uL    Basophils Absolute 0.06 0.00 - 0.10 x10*3/uL   aPTT   Result Value Ref Range    aPTT 34 27 - 38 seconds   Protime-INR   Result Value Ref Range    Protime 12.0 9.8 - 12.8 seconds    INR 1.1 0.9 - 1.1     Esophagogastroduodenoscopy (EGD)    Result Date: 10/13/2024  Table formatting from the original result was not included. Impression Food bolus in the lower third of the esophagus, successfully retrieved Abnormal mucosa with concentric rings and linear furrows in the middle third of the esophagus and lower third  of the esophagus; performed cold forceps biopsy Intrinsic stricture (not traversable) in the lower third of the esophagus. Endoscope changed to ultrathin endoscope. Grade D esophagitis in the lower third of the esophagus The stomach appeared normal. The duodenal bulb and 2nd part of the duodenum appeared normal. Findings Food bolus in the lower third of the esophagus, successfully retrieved with retrieval snare net Abnormal mucosa with concentric rings and linear furrows in the middle third of the esophagus and lower third of the esophagus; performed cold forceps biopsy to rule out eosinophilic esophagitis. Mucosal abnormality was suggestive of EoE. Biopsies were taken from distal esophagus and proximal esophagus Intrinsic stricture (not traversable) in the lower third of the esophagus (37 cm from the incisors). Stricture was not traversable by regular EGD scope. It was traversable with an ultrathin endoscope. Grade D esophagitis with mucosal breaks measuring 5 mm or more, continuous between folds, covering 75% or more of the circumference in the lower third of the esophagus The stomach appeared normal. The duodenal bulb and 2nd part of the duodenum appeared normal. Recommendation Await pathology results Repeat EGD in 1 month, due: 11/12/2024 Omeprazole 40 mg PO bid for 12 weeks, and then once a day Full liquid diet today, then soft mechanical diet from tomorrow  Indication Food impaction of esophagus, initial encounter Staff Staff Role No Staff Documented Medications See Anesthesia Record. Preprocedure A history and physical has been performed, and patient medication allergies have been reviewed. The patient's tolerance of previous anesthesia has been reviewed. The risks and benefits of the procedure and the sedation options and risks were discussed with the patient. All questions were answered and informed consent obtained. Details of the Procedure The patient underwent general anesthesia, which was administered  by an anesthesia professional. The patient's blood pressure, ECG, ETCO2, heart rate, level of consciousness, respirations and oxygen were monitored throughout the procedure. The scope was introduced through the mouth and advanced to the second part of the duodenum. Retroflexion was performed in the cardia. The patient's estimated blood loss was minimal (<5 mL). The procedure was not difficult. The patient tolerated the procedure well. There were no apparent adverse events. The endoscope was changed to ultrathin endoscope to traverse the esophageal stricture. Events Procedure Events Event Event Time Specimens * Cannot find log * Procedure Location [facility] Emergency Medicine [address] [facility] [id number] [phone] Referring Provider Carlita Hennessy MD, MS Procedure Provider Carlita Hennessy MD, MS     XR neck soft tissue    Result Date: 10/13/2024  Interpreted By:  Emile Chase, STUDY: XR NECK SOFT TISSUE; XR CHEST 2 VIEWS   INDICATION: Signs/Symptoms:Food impaction, assess for perforation or radiopaque foreign body.   COMPARISON: June 25, 2018   ACCESSION NUMBER(S): [id number]; [id number]   ORDERING CLINICIAN: ISHMAEL NGO   FINDINGS: Two views of the neck with soft tissue technique.   Two views chest.   Prevertebral soft tissues normal. Epiglottis normal. No radiopaque foreign body identified.   Chest demonstrates no consolidation, effusion, edema, or pneumothorax. Heart size within normal limits.       No acute findings chest or neck soft tissues. No evidence of radiopaque foreign body.   Signed by: Emile Chase 10/13/2024 5:04 PM Dictation workstation:   [id number]    XR chest 2 views    Result Date: 10/13/2024  Interpreted By:  Emile Chase, STUDY: XR NECK SOFT TISSUE; XR CHEST 2 VIEWS   INDICATION: Signs/Symptoms:Food impaction, assess for perforation or radiopaque foreign body.   COMPARISON: June 25, 2018   ACCESSION NUMBER(S): [id number]; [id number]    ORDERING CLINICIAN: ISHMAEL NGO   FINDINGS: Two views of the neck with soft tissue technique.   Two views chest.   Prevertebral soft tissues normal. Epiglottis normal. No radiopaque foreign body identified.   Chest demonstrates no consolidation, effusion, edema, or pneumothorax. Heart size within normal limits.       No acute findings chest or neck soft tissues. No evidence of radiopaque foreign body.   Signed by: Emile Chase 10/13/2024 5:04 PM Dictation workstation:   [id number]     Assessment/Plan   Assessment & Plan  Food impaction of esophagus, initial encounter      Markie Mukherjee is a 29 y.o. male with a past medical history of food impaction (last occurrence 8/2024), and GERD who presents to [facility] ED for complaints of food impaction. Transferred to [facility] for GI consultation/ EGD. Patient had eaten a hot dog that subsequently got stuck in his throat.  He was unable to swallow and liquids. Denied any shortness of breath, difficulty breathing. Additionally started to have some associated epigastric pain. Promptly came to ED for evaluation.     ED work up:  XR Neck: No acute findings chest or neck soft tissues. No evidence of   radiopaque foreign body.   CXR: No acute findings chest or neck soft tissues. No evidence of   radiopaque foreign body.  Underwent a successful EGD/ Food bolus removal by Dr Hennessy. Mucosa severely inflamed  Full liquid diet initially and then transition to soft diet tomorrow  Per anesthesia- recommend watching overnight due to general anesthesia ( and patient did not have transportation home this evening)  Continue PPI BID    VTE: low risk; ambulation    Discharge dispoistion  -HNN: 10/14/24    Labs, results and plan of care to be discussed and reviewed with Attending.     Gracia Ferguson, APRN-CNP

## 2024-10-14 NOTE — DISCHARGE SUMMARY
Discharge Diagnosis  Food impaction of esophagus, initial encounter    Issues Requiring Follow-Up  PCP, GI    Discharge Meds     Medication List      CHANGE how you take these medications     pantoprazole 40 mg EC tablet; Commonly known as: ProtoNix; Take 1 tablet   (40 mg) by mouth 2 times a day before meals. Do not crush, chew, or   split.; What changed: when to take this     CONTINUE taking these medications     nicotine 14 mg/24 hr patch; Commonly known as: Nicoderm CQ; Place 1   patch over 24 hours on the skin once daily.   polyethylene glycol 17 gram/dose powder; Commonly known as: Miralax;   Take 17 g by mouth 2 times a day.       Test Results Pending At Discharge  Pending Labs       Order Current Status    Surgical Pathology Exam In process            Hospital Course  Markie Mukherjee is a 29 y.o. male with a past medical history of food impaction (last occurrence 8/2024), and GERD who presents to [facility] ED for complaints of food impaction. Transferred to [facility] for GI consultation/ EGD. Patient had eaten a hot dog that subsequently got stuck in his throat.  He was unable to swallow and liquids. Denied any shortness of breath, difficulty breathing. Additionally started to have some associated epigastric pain. Promptly came to ED for evaluation.      ED work up:  XR Neck: No acute findings chest or neck soft tissues. No evidence of   radiopaque foreign body.   CXR: No acute findings chest or neck soft tissues. No evidence of   radiopaque foreign body.      Underwent a successful EGD/ Food bolus removal by Dr Hennessy. Mucosa severely inflamed. Full liquid diet initially and then transitioned to soft diet. Instructed patient to take PPI BID for 12 weeks, then daily.  Educated patient to be compliant with PPI. Referral to GI placed for follow up. Discussed with patient repeat EGD is recommended by GI. Stable for discharge.    Discussed plan and discharge with Dr. Johnson and interdisciplinary  team.     Pertinent Physical Exam At Time of Discharge  Physical Exam  Constitutional:       Appearance: Normal appearance.   Cardiovascular:      Rate and Rhythm: Normal rate and regular rhythm.   Pulmonary:      Effort: Pulmonary effort is normal.      Breath sounds: Normal breath sounds.   Abdominal:      General: Bowel sounds are normal.      Palpations: Abdomen is soft.      Tenderness: There is no abdominal tenderness.   Neurological:      Mental Status: He is alert.         Outpatient Follow-Up  No future appointments.      Stacey León PA-C

## 2024-10-15 ENCOUNTER — PATIENT OUTREACH (OUTPATIENT)
Dept: CARE COORDINATION | Facility: CLINIC | Age: 29
End: 2024-10-15
Payer: COMMERCIAL

## 2024-10-15 NOTE — PROGRESS NOTES
Discharge facility: [facility]  Discharge diagnosis: Food impaction of esophagus, initial encounter   Admission date: 10/13/24  Discharge date: 10/14/24  PCP Appointment Date: Needs scheduled   Specialist Appointment Date: GI needs scheduled    Hospital Encounter and Summary: Linked     Outreach call to patient to support a smooth transition of care from recent admission. Left voicemail message for patient with my contact information. Will continue to follow through transition period.    Debra Potts RN, [facility]  Accountable Care Organization Operations Office  [address] [place] [address]  Phone [phone]

## 2024-10-23 LAB
LABORATORY COMMENT REPORT: NORMAL
PATH REPORT.FINAL DX SPEC: NORMAL
PATH REPORT.GROSS SPEC: NORMAL
PATH REPORT.TOTAL CANCER: NORMAL

## 2024-10-28 NOTE — RESULT ENCOUNTER NOTE
Mindi,    Can you please call him and schedule an OV with me in 1 month.    Thank you,    Carlita Hennessy MD, MS

## 2024-10-29 ENCOUNTER — PATIENT OUTREACH (OUTPATIENT)
Dept: CARE COORDINATION | Facility: CLINIC | Age: 29
End: 2024-10-29
Payer: COMMERCIAL

## 2024-11-13 ENCOUNTER — TELEPHONE (OUTPATIENT)
Facility: CLINIC | Age: 29
End: 2024-11-13
Payer: COMMERCIAL

## 2024-11-13 NOTE — TELEPHONE ENCOUNTER
----- Message from Carlita Hennessy sent at 10/28/2024 10:56 AM EDT -----  Mindi,    Can you please call him and schedule an OV with me in 1 month.    Thank you,    Carlita Hennessy MD, MS

## 2024-11-14 ENCOUNTER — PATIENT OUTREACH (OUTPATIENT)
Dept: CARE COORDINATION | Facility: CLINIC | Age: 29
End: 2024-11-14
Payer: COMMERCIAL

## 2024-11-14 NOTE — PROGRESS NOTES
Check in 30 days after hospital discharge to support smooth transition of care. No recent hospital admissions noted upon chart review. Will close this [facility] encounter at this time due to unable to reach patient upon 30 days.    Debra Potts RN, [facility]  Phone [phone]
